# Patient Record
Sex: FEMALE | Race: WHITE | NOT HISPANIC OR LATINO
[De-identification: names, ages, dates, MRNs, and addresses within clinical notes are randomized per-mention and may not be internally consistent; named-entity substitution may affect disease eponyms.]

---

## 2017-06-07 ENCOUNTER — RESULT REVIEW (OUTPATIENT)
Age: 52
End: 2017-06-07

## 2017-08-31 ENCOUNTER — APPOINTMENT (OUTPATIENT)
Dept: ULTRASOUND IMAGING | Facility: IMAGING CENTER | Age: 52
End: 2017-08-31
Payer: COMMERCIAL

## 2017-08-31 ENCOUNTER — APPOINTMENT (OUTPATIENT)
Dept: MAMMOGRAPHY | Facility: IMAGING CENTER | Age: 52
End: 2017-08-31
Payer: COMMERCIAL

## 2017-08-31 ENCOUNTER — OUTPATIENT (OUTPATIENT)
Dept: OUTPATIENT SERVICES | Facility: HOSPITAL | Age: 52
LOS: 1 days | End: 2017-08-31
Payer: COMMERCIAL

## 2017-08-31 DIAGNOSIS — Z00.8 ENCOUNTER FOR OTHER GENERAL EXAMINATION: ICD-10-CM

## 2017-08-31 PROCEDURE — 76641 ULTRASOUND BREAST COMPLETE: CPT

## 2017-08-31 PROCEDURE — 77067 SCR MAMMO BI INCL CAD: CPT

## 2017-08-31 PROCEDURE — G0202: CPT | Mod: 26

## 2017-08-31 PROCEDURE — 76641 ULTRASOUND BREAST COMPLETE: CPT | Mod: 26,50

## 2017-08-31 PROCEDURE — 77063 BREAST TOMOSYNTHESIS BI: CPT | Mod: 26

## 2017-08-31 PROCEDURE — 77063 BREAST TOMOSYNTHESIS BI: CPT

## 2018-12-26 ENCOUNTER — TRANSCRIPTION ENCOUNTER (OUTPATIENT)
Age: 53
End: 2018-12-26

## 2019-01-04 ENCOUNTER — TRANSCRIPTION ENCOUNTER (OUTPATIENT)
Age: 54
End: 2019-01-04

## 2019-03-25 ENCOUNTER — RESULT REVIEW (OUTPATIENT)
Age: 54
End: 2019-03-25

## 2019-04-16 ENCOUNTER — APPOINTMENT (OUTPATIENT)
Dept: ULTRASOUND IMAGING | Facility: IMAGING CENTER | Age: 54
End: 2019-04-16
Payer: COMMERCIAL

## 2019-04-16 ENCOUNTER — APPOINTMENT (OUTPATIENT)
Dept: MAMMOGRAPHY | Facility: IMAGING CENTER | Age: 54
End: 2019-04-16
Payer: COMMERCIAL

## 2019-04-16 ENCOUNTER — OUTPATIENT (OUTPATIENT)
Dept: OUTPATIENT SERVICES | Facility: HOSPITAL | Age: 54
LOS: 1 days | End: 2019-04-16
Payer: COMMERCIAL

## 2019-04-16 DIAGNOSIS — Z12.31 ENCOUNTER FOR SCREENING MAMMOGRAM FOR MALIGNANT NEOPLASM OF BREAST: ICD-10-CM

## 2019-04-16 DIAGNOSIS — R92.2 INCONCLUSIVE MAMMOGRAM: ICD-10-CM

## 2019-04-16 PROCEDURE — 77067 SCR MAMMO BI INCL CAD: CPT | Mod: 26

## 2019-04-16 PROCEDURE — 77063 BREAST TOMOSYNTHESIS BI: CPT | Mod: 26

## 2019-04-16 PROCEDURE — 76641 ULTRASOUND BREAST COMPLETE: CPT | Mod: 26,50

## 2019-04-16 PROCEDURE — 77067 SCR MAMMO BI INCL CAD: CPT

## 2019-04-16 PROCEDURE — 77063 BREAST TOMOSYNTHESIS BI: CPT

## 2019-04-16 PROCEDURE — 76641 ULTRASOUND BREAST COMPLETE: CPT

## 2019-09-09 ENCOUNTER — TRANSCRIPTION ENCOUNTER (OUTPATIENT)
Age: 54
End: 2019-09-09

## 2019-09-23 ENCOUNTER — NON-APPOINTMENT (OUTPATIENT)
Age: 54
End: 2019-09-23

## 2019-09-23 ENCOUNTER — APPOINTMENT (OUTPATIENT)
Dept: FAMILY MEDICINE | Facility: CLINIC | Age: 54
End: 2019-09-23
Payer: COMMERCIAL

## 2019-09-23 ENCOUNTER — LABORATORY RESULT (OUTPATIENT)
Age: 54
End: 2019-09-23

## 2019-09-23 VITALS
DIASTOLIC BLOOD PRESSURE: 80 MMHG | SYSTOLIC BLOOD PRESSURE: 126 MMHG | OXYGEN SATURATION: 98 % | BODY MASS INDEX: 29.35 KG/M2 | HEIGHT: 70 IN | WEIGHT: 205 LBS | HEART RATE: 80 BPM

## 2019-09-23 DIAGNOSIS — Z82.5 FAMILY HISTORY OF ASTHMA AND OTHER CHRONIC LOWER RESPIRATORY DISEASES: ICD-10-CM

## 2019-09-23 DIAGNOSIS — Z78.9 OTHER SPECIFIED HEALTH STATUS: ICD-10-CM

## 2019-09-23 DIAGNOSIS — Z81.1 FAMILY HISTORY OF ALCOHOL ABUSE AND DEPENDENCE: ICD-10-CM

## 2019-09-23 DIAGNOSIS — Z83.3 FAMILY HISTORY OF DIABETES MELLITUS: ICD-10-CM

## 2019-09-23 DIAGNOSIS — N39.0 URINARY TRACT INFECTION, SITE NOT SPECIFIED: ICD-10-CM

## 2019-09-23 DIAGNOSIS — R06.83 SNORING: ICD-10-CM

## 2019-09-23 DIAGNOSIS — Z87.891 PERSONAL HISTORY OF NICOTINE DEPENDENCE: ICD-10-CM

## 2019-09-23 LAB
BILIRUB UR QL STRIP: NEGATIVE
CLARITY UR: CLEAR
COLLECTION METHOD: NORMAL
GLUCOSE UR-MCNC: NEGATIVE
HCG UR QL: 0.2 EU/DL
HGB UR QL STRIP.AUTO: NEGATIVE
KETONES UR-MCNC: NEGATIVE
LEUKOCYTE ESTERASE UR QL STRIP: NORMAL
NITRITE UR QL STRIP: NEGATIVE
PH UR STRIP: 6
PROT UR STRIP-MCNC: NEGATIVE
SP GR UR STRIP: 1.02

## 2019-09-23 PROCEDURE — G0444 DEPRESSION SCREEN ANNUAL: CPT

## 2019-09-23 PROCEDURE — 81003 URINALYSIS AUTO W/O SCOPE: CPT | Mod: QW

## 2019-09-23 PROCEDURE — 93000 ELECTROCARDIOGRAM COMPLETE: CPT

## 2019-09-23 PROCEDURE — 99386 PREV VISIT NEW AGE 40-64: CPT | Mod: 25

## 2019-09-23 PROCEDURE — 36415 COLL VENOUS BLD VENIPUNCTURE: CPT

## 2019-09-23 NOTE — PLAN
[FreeTextEntry1] : Snoring- f/u sleep study\par Shoulder/hip pain- may need ortho f/u. f/u ESR\par Urethral burning- trace LE, will treat as UTI, f/u cx\par HCM- f/u labs, EKG NSR 74 bpm, referral for colonoscopy

## 2019-09-23 NOTE — HEALTH RISK ASSESSMENT
[Patient reported mammogram was normal] : Patient reported mammogram was normal [Patient reported PAP Smear was normal] : Patient reported PAP Smear was normal [MammogramDate] : 5/2019 [PapSmearDate] : 5/2019

## 2019-09-23 NOTE — PHYSICAL EXAM
[No Acute Distress] : no acute distress [Well-Appearing] : well-appearing [Normal Sclera/Conjunctiva] : normal sclera/conjunctiva [PERRL] : pupils equal round and reactive to light [Normal Oropharynx] : the oropharynx was normal [Normal Outer Ear/Nose] : the outer ears and nose were normal in appearance [No Lymphadenopathy] : no lymphadenopathy [Supple] : supple [No Respiratory Distress] : no respiratory distress  [No Accessory Muscle Use] : no accessory muscle use [Regular Rhythm] : with a regular rhythm [Normal Rate] : normal rate  [Soft] : abdomen soft [Non Tender] : non-tender [Non-distended] : non-distended [Normal Bowel Sounds] : normal bowel sounds [No Focal Deficits] : no focal deficits [Normal Affect] : the affect was normal [Normal Insight/Judgement] : insight and judgment were intact

## 2019-09-23 NOTE — REVIEW OF SYSTEMS
[Fever] : no fever [Chills] : no chills [Fatigue] : no fatigue [Discharge] : no discharge [Vision Problems] : no vision problems [Earache] : no earache [Hearing Loss] : no hearing loss [Nasal Discharge] : no nasal discharge [Palpitations] : no palpitations [Chest Pain] : no chest pain [Shortness Of Breath] : no shortness of breath [Cough] : no cough [Wheezing] : no wheezing [Nausea] : no nausea [Abdominal Pain] : no abdominal pain [Diarrhea] : diarrhea [Vomiting] : no vomiting [Dysuria] : no dysuria [Hematuria] : no hematuria [Frequency] : no frequency [Joint Pain] : joint pain [Joint Stiffness] : joint stiffness [Headache] : no headache [Dizziness] : no dizziness [Anxiety] : no anxiety [Depression] : no depression

## 2019-09-23 NOTE — HISTORY OF PRESENT ILLNESS
[FreeTextEntry1] : establish care [de-identified] : 53 year old female presents to establish care. She complains of chronic shoulder and hip pain. She also complains today of urethral burning that does not occur with urination.Denies hesitancy and urgency. Denies using new bath products or detergents. She otherwise feels well. Typically goes to the gym and eats well although due to recent back pain has not worked out. She had recent mammo which was normal. Due for colonoscopy . She also admits to excessive snoring and daytime fatigue .Was told by dentist to be evaluated for DARRION

## 2019-09-26 ENCOUNTER — TRANSCRIPTION ENCOUNTER (OUTPATIENT)
Age: 54
End: 2019-09-26

## 2019-09-26 LAB
ALBUMIN SERPL ELPH-MCNC: 4.2 G/DL
ALP BLD-CCNC: 59 U/L
ALT SERPL-CCNC: 14 U/L
ANION GAP SERPL CALC-SCNC: 12 MMOL/L
AST SERPL-CCNC: 15 U/L
B MICROTI AB SER QL: NORMAL
BABESIA ANTIBODIES, IGG: NORMAL
BABESIA ANTIBODIES, IGM: NORMAL
BASOPHILS # BLD AUTO: 0.05 K/UL
BASOPHILS NFR BLD AUTO: 0.6 %
BILIRUB SERPL-MCNC: 0.3 MG/DL
BUN SERPL-MCNC: 17 MG/DL
CALCIUM SERPL-MCNC: 9.3 MG/DL
CHLORIDE SERPL-SCNC: 101 MMOL/L
CHOLEST SERPL-MCNC: 175 MG/DL
CHOLEST/HDLC SERPL: 3.6 RATIO
CO2 SERPL-SCNC: 24 MMOL/L
CREAT SERPL-MCNC: 0.77 MG/DL
EOSINOPHIL # BLD AUTO: 0.39 K/UL
EOSINOPHIL NFR BLD AUTO: 4.3 %
ERYTHROCYTE [SEDIMENTATION RATE] IN BLOOD BY WESTERGREN METHOD: 29 MM/HR
ESTIMATED AVERAGE GLUCOSE: 126 MG/DL
GLUCOSE SERPL-MCNC: 92 MG/DL
HBA1C MFR BLD HPLC: 6 %
HCT VFR BLD CALC: 36.5 %
HDLC SERPL-MCNC: 49 MG/DL
HGB BLD-MCNC: 11.4 G/DL
IMM GRANULOCYTES NFR BLD AUTO: 0.2 %
LDLC SERPL CALC-MCNC: 93 MG/DL
LYMPHOCYTES # BLD AUTO: 2.87 K/UL
LYMPHOCYTES NFR BLD AUTO: 32 %
MAN DIFF?: NORMAL
MCHC RBC-ENTMCNC: 28.8 PG
MCHC RBC-ENTMCNC: 31.2 GM/DL
MCV RBC AUTO: 92.2 FL
MONOCYTES # BLD AUTO: 0.69 K/UL
MONOCYTES NFR BLD AUTO: 7.7 %
NEUTROPHILS # BLD AUTO: 4.95 K/UL
NEUTROPHILS NFR BLD AUTO: 55.2 %
PLATELET # BLD AUTO: 290 K/UL
POTASSIUM SERPL-SCNC: 4.2 MMOL/L
PROT SERPL-MCNC: 6.9 G/DL
RBC # BLD: 3.96 M/UL
RBC # FLD: 13.8 %
SODIUM SERPL-SCNC: 137 MMOL/L
TRIGL SERPL-MCNC: 166 MG/DL
TSH SERPL-ACNC: 0.91 UIU/ML
WBC # FLD AUTO: 8.97 K/UL

## 2019-09-27 ENCOUNTER — RESULT REVIEW (OUTPATIENT)
Age: 54
End: 2019-09-27

## 2019-09-27 DIAGNOSIS — A77.40 EHRLICHIOSIS, UNSPECIFIED: ICD-10-CM

## 2019-09-27 LAB
25(OH)D3 SERPL-MCNC: 38.2 NG/ML
ANAPLASMA PHAGOCYTO IGM COMENT: NORMAL
ANAPLASMA PHAGOCYTO IGM COMMENT: NORMAL
ANAPLASMA PHAGOCYTOPHILIA IGG ANTIBODIES: ABNORMAL
ANAPLASMA PHAGOCYTOPHILIA IGM ANTIBODIES: NORMAL
EHRLICIA CHAFFEENIS IGG ANTIBODIES: ABNORMAL
EHRLICIA CHAFFEENIS IGG COMMENT: NORMAL
EHRLICIA CHAFFEENIS IGG INTERP: NORMAL
EHRLICIA CHAFFEENIS IGM ANTIBODIES: NORMAL

## 2019-09-30 LAB
B BURGDOR AB SER-IMP: NEGATIVE
B BURGDOR IGG+IGM SER QL: 0.23 INDEX

## 2019-10-21 ENCOUNTER — APPOINTMENT (OUTPATIENT)
Dept: ORTHOPEDIC SURGERY | Facility: CLINIC | Age: 54
End: 2019-10-21
Payer: COMMERCIAL

## 2019-10-21 VITALS
HEART RATE: 79 BPM | WEIGHT: 200 LBS | HEIGHT: 70 IN | BODY MASS INDEX: 28.63 KG/M2 | SYSTOLIC BLOOD PRESSURE: 129 MMHG | DIASTOLIC BLOOD PRESSURE: 77 MMHG | TEMPERATURE: 97.9 F

## 2019-10-21 PROCEDURE — 73502 X-RAY EXAM HIP UNI 2-3 VIEWS: CPT | Mod: RT

## 2019-10-21 PROCEDURE — 99203 OFFICE O/P NEW LOW 30 MIN: CPT

## 2019-10-21 PROCEDURE — 73030 X-RAY EXAM OF SHOULDER: CPT | Mod: RT

## 2019-10-21 NOTE — ASSESSMENT
[FreeTextEntry1] : Patient with right shoulder calcific tendinitis and right hip osteoarthritis. The nature of these conditions were described at length with the patient and she verbalized understanding. We recommend an ultrasound guided aspiration with cortisone injection of the right shoulder and meloxicam for an anti-inflammatory. The patient will follow back up in 6 weeks should her symptoms continue.

## 2019-10-21 NOTE — HISTORY OF PRESENT ILLNESS
[FreeTextEntry1] : 10/21/2019: Patient is a 54 year-old female who presents with about 5 years of right shoulder and right hip pain. The shoulder pain is sharp in nature, located laterally and radiates down the lateral aspect of the humerus. She was treated in the past for calcific tendinitis and her symptoms did not improve. Her right hip pain is mostly in the morning and associated with stiffness upon getting up in the morning. She states she gets pain that radiates into her groin and sometimes has difficulty flexing her hip. She denies any upper and lower extremity paresthesias.

## 2019-10-21 NOTE — PHYSICAL EXAM
[Normal] : Oriented to person, place, and time, insight and judgement were intact and the affect was normal [de-identified] : Right Shoulder Exam\par \par Inspection: No malalignment, no defects\par Skin: No masses, no lesions, skin intact\par Neck: Negative Spurlings, full ROM without pain\par ROM: Full active ROM of the RIGHT shoulder with pain on overhead motions\par Tenderness: No bicipital tenderness, no tenderness to the greater tuberosity/RTC insertion, no anterior shoulder/lesser tuberosity tenderness\par Strength: 5/5 strength against resistance with ER, IR, and Supraspinatus testing.\par AC Joint: No TTP, no pain with cross arm testing.\par Biceps: Speed negative, Yergusons negative\par Impingement Test: Positive Hassan\par Stability: Negative Apprehension, negative anterior/posterior load and shift\par Vascular: 2+ radial pulse\par Neuro: AIN/PIN/Ulnar nn. intact to motor\par Sensation: Grossly intact without deficits\par \par Hip Examination \par ROM \par Flexion 125 degrees\par Extension 115 degrees\par External 45 degrees\par Internal 45 Degrees\par Abduction 45 degrees\par Adduction 45 degrees\par \par Length equal bilaterally \par \par No Pain with SLR, Mild pain with hip flexion, No pain to trochanteric Bursa,  \par \par TAMY - Negative \par  Impingement negative\par \par Distal pulses 2 +\par Sensation intact to touch throughout leg and equal bilaterally\par  [de-identified] : 2 xray views of the right shoulder were obtained. There are no fractures or dislocations noted. There is evidence of calcific tendinitis. \par 3 xray views of the right hip were obtained. There are no fractures or dislocations noted. There is evidence of early osteoarthritis.

## 2019-10-25 ENCOUNTER — APPOINTMENT (OUTPATIENT)
Dept: ULTRASOUND IMAGING | Facility: CLINIC | Age: 54
End: 2019-10-25

## 2019-11-10 ENCOUNTER — FORM ENCOUNTER (OUTPATIENT)
Age: 54
End: 2019-11-10

## 2019-11-11 ENCOUNTER — APPOINTMENT (OUTPATIENT)
Dept: ULTRASOUND IMAGING | Facility: CLINIC | Age: 54
End: 2019-11-11
Payer: COMMERCIAL

## 2019-11-11 ENCOUNTER — OUTPATIENT (OUTPATIENT)
Dept: OUTPATIENT SERVICES | Facility: HOSPITAL | Age: 54
LOS: 1 days | End: 2019-11-11
Payer: COMMERCIAL

## 2019-11-11 DIAGNOSIS — Z00.8 ENCOUNTER FOR OTHER GENERAL EXAMINATION: ICD-10-CM

## 2019-11-11 DIAGNOSIS — M75.31 CALCIFIC TENDINITIS OF RIGHT SHOULDER: ICD-10-CM

## 2019-11-11 PROCEDURE — 20611 DRAIN/INJ JOINT/BURSA W/US: CPT | Mod: RT

## 2019-11-11 PROCEDURE — 20611 DRAIN/INJ JOINT/BURSA W/US: CPT

## 2020-01-30 ENCOUNTER — APPOINTMENT (OUTPATIENT)
Dept: ORTHOPEDIC SURGERY | Facility: CLINIC | Age: 55
End: 2020-01-30
Payer: COMMERCIAL

## 2020-01-30 VITALS
HEART RATE: 76 BPM | BODY MASS INDEX: 28.63 KG/M2 | DIASTOLIC BLOOD PRESSURE: 90 MMHG | HEIGHT: 70 IN | WEIGHT: 200 LBS | SYSTOLIC BLOOD PRESSURE: 138 MMHG | TEMPERATURE: 98.4 F

## 2020-01-30 PROCEDURE — 20610 DRAIN/INJ JOINT/BURSA W/O US: CPT | Mod: RT

## 2020-01-30 PROCEDURE — 73502 X-RAY EXAM HIP UNI 2-3 VIEWS: CPT | Mod: RT

## 2020-01-30 PROCEDURE — 73030 X-RAY EXAM OF SHOULDER: CPT | Mod: RT

## 2020-01-30 PROCEDURE — 99214 OFFICE O/P EST MOD 30 MIN: CPT | Mod: 25

## 2020-02-06 NOTE — ADDENDUM
[FreeTextEntry1] : Dictated by Angella Torres, OTR/L, PA \par \par This note was written by Yessenia Larsen on 02/05/2020 acting as scribe for Chetan Rg III, MD

## 2020-02-06 NOTE — PROCEDURE
[de-identified] : Consent: \par At this time, I have recommended an injection to the right shoulder.  The risks and benefits of the procedure were discussed with the patient in detail.  Upon verbal consent of the patient, we proceeded with the injection as noted below.  \par \par Procedure:  \par After a sterile prep, the patient underwent an injection of 9 cc of 1% Lidocaine without epinephrine and 1 cc of Kenalog into the right shoulder.  The patient tolerated the procedure well.  There were no complications.

## 2020-02-06 NOTE — PHYSICAL EXAM
[de-identified] : Right Hip:\par Range of Motion in Degrees:\par 	                                  Claimant:	Normal:	\par Flexion (Active) 	                  120 	                120-degrees	\par Flexion (Passive)	                  120	                120-degrees	\par Extension (Active)	                  -30	                -30-degrees	\par Extension (Passive)	  -30	                -30-degrees	\par Abduction (Active)	                  45-50	                33-10-swyjyid	\par Abduction (Passive)	  45-50	                45-26-fcqobvn	\par Adduction (Active)	                  20-30	                04-60-kscodxr	\par Adduction (Passive)	  20-30	                06-01-aywdgwu	\par Internal Rotation (Active) 	 35	                35-degrees	\par Internal Rotation (Passive)	 35	                35-degrees	\par External Rotation (Active)	 45	                45-degrees	\par External Rotation (Passive)	 45	                45-degrees	\par \par Tenderness into the groin with internal and external rotation and axial load.  No tenderness to palpation over the greater trochanter.  Negative Trendelenburg.  No tenderness with resisted abduction.  No weakness to flexion, extension, abduction or adduction.  No evidence of instability.  No motor or sensory deficits.  2+ DP and PT pulses.  Skin is intact.  No scars, rashes or lesions.  \par  \par Right Shoulder: \par Range of Motion in Degrees:  \par 	                        Claimant:	 Normal:	\par Abduction (Active)	               180	180 degrees	\par Abduction (Passive)	180	180 degrees	\par Forward elevation (Active):	180	180 degrees	\par Forward elevation (Passive):	180	180 degrees	\par External rotation (Active):	45	45 degrees	\par External rotation (Passive):	45	45 degrees	\par Internal rotation (Active):	L-1	L-1	\par Internal rotation (Passive):	L-1	L-1	\par \par No motor weakness to internal rotation, external rotation or abduction in the scapular plane.  Negative crank test.  Negative O’Manpreet’s test.  Negative Speed’s test. Negative Yergason’s test.  Negative cross arm test.  No tenderness to palpation at the AC joint. Positive Hawkin’s sign.  Positive Neer’s sign. Negative apprehension. Negative sulcus sign.  No gross neurological or vascular deficits distally.  Skin is intact.  No rashes, scars or lesions. 2+ radial and ulnar pulses. No extra-articular swelling or tenderness.\par   [de-identified] : Ambulating with a slightly antalgic to antalgic gait.  Station:  Normal.  [de-identified] : General Appearance:  Well-developed, well-nourished female in no acute distress. \par  [de-identified] : X-ray examination, two views of the right shoulder, show calcific tendinitis.  \par \par X-ray examination, two views of the right hip, shows osteoarthritis.

## 2020-02-06 NOTE — DISCUSSION/SUMMARY
[de-identified] : Patient was given a cortisone injection for the right shoulder calcific tendinitis, as noted above.  She was given a prescription for an intra-articular injection for the right hip osteoarthritis.  She is advised to return back to the office in three to four weeks.

## 2020-02-06 NOTE — HISTORY OF PRESENT ILLNESS
[de-identified] : The patient comes in today for her right hip and right shoulder with persistent complaints.

## 2020-02-10 ENCOUNTER — FORM ENCOUNTER (OUTPATIENT)
Age: 55
End: 2020-02-10

## 2020-02-11 ENCOUNTER — OUTPATIENT (OUTPATIENT)
Dept: OUTPATIENT SERVICES | Facility: HOSPITAL | Age: 55
LOS: 1 days | End: 2020-02-11
Payer: COMMERCIAL

## 2020-02-11 ENCOUNTER — APPOINTMENT (OUTPATIENT)
Dept: ULTRASOUND IMAGING | Facility: CLINIC | Age: 55
End: 2020-02-11
Payer: COMMERCIAL

## 2020-02-11 DIAGNOSIS — M16.11 UNILATERAL PRIMARY OSTEOARTHRITIS, RIGHT HIP: ICD-10-CM

## 2020-02-11 PROCEDURE — 20611 DRAIN/INJ JOINT/BURSA W/US: CPT

## 2020-02-11 PROCEDURE — 20611 DRAIN/INJ JOINT/BURSA W/US: CPT | Mod: RT

## 2020-04-25 ENCOUNTER — MESSAGE (OUTPATIENT)
Age: 55
End: 2020-04-25

## 2020-05-08 ENCOUNTER — APPOINTMENT (OUTPATIENT)
Dept: DISASTER EMERGENCY | Facility: HOSPITAL | Age: 55
End: 2020-05-08

## 2020-05-09 LAB
SARS-COV-2 IGG SERPL IA-ACNC: 0.1 INDEX
SARS-COV-2 IGG SERPL QL IA: NEGATIVE

## 2020-06-12 ENCOUNTER — RESULT REVIEW (OUTPATIENT)
Age: 55
End: 2020-06-12

## 2020-09-16 ENCOUNTER — OUTPATIENT (OUTPATIENT)
Dept: OUTPATIENT SERVICES | Facility: HOSPITAL | Age: 55
LOS: 1 days | End: 2020-09-16
Payer: COMMERCIAL

## 2020-09-16 ENCOUNTER — APPOINTMENT (OUTPATIENT)
Dept: MAMMOGRAPHY | Facility: CLINIC | Age: 55
End: 2020-09-16
Payer: COMMERCIAL

## 2020-09-16 ENCOUNTER — APPOINTMENT (OUTPATIENT)
Dept: ULTRASOUND IMAGING | Facility: CLINIC | Age: 55
End: 2020-09-16
Payer: COMMERCIAL

## 2020-09-16 DIAGNOSIS — Z12.31 ENCOUNTER FOR SCREENING MAMMOGRAM FOR MALIGNANT NEOPLASM OF BREAST: ICD-10-CM

## 2020-09-16 DIAGNOSIS — Z00.8 ENCOUNTER FOR OTHER GENERAL EXAMINATION: ICD-10-CM

## 2020-09-16 PROCEDURE — 76641 ULTRASOUND BREAST COMPLETE: CPT | Mod: 26,50

## 2020-09-16 PROCEDURE — 77063 BREAST TOMOSYNTHESIS BI: CPT | Mod: 26

## 2020-09-16 PROCEDURE — 77067 SCR MAMMO BI INCL CAD: CPT | Mod: 26

## 2020-09-16 PROCEDURE — 77063 BREAST TOMOSYNTHESIS BI: CPT

## 2020-09-16 PROCEDURE — 77067 SCR MAMMO BI INCL CAD: CPT

## 2020-09-16 PROCEDURE — 76641 ULTRASOUND BREAST COMPLETE: CPT

## 2020-10-06 ENCOUNTER — NON-APPOINTMENT (OUTPATIENT)
Age: 55
End: 2020-10-06

## 2020-10-06 ENCOUNTER — APPOINTMENT (OUTPATIENT)
Dept: FAMILY MEDICINE | Facility: CLINIC | Age: 55
End: 2020-10-06
Payer: COMMERCIAL

## 2020-10-06 VITALS
WEIGHT: 195.5 LBS | SYSTOLIC BLOOD PRESSURE: 136 MMHG | DIASTOLIC BLOOD PRESSURE: 90 MMHG | HEIGHT: 69.75 IN | OXYGEN SATURATION: 98 % | BODY MASS INDEX: 28.31 KG/M2 | TEMPERATURE: 98 F | HEART RATE: 84 BPM

## 2020-10-06 PROCEDURE — 99396 PREV VISIT EST AGE 40-64: CPT | Mod: 25

## 2020-10-06 PROCEDURE — 96127 BRIEF EMOTIONAL/BEHAV ASSMT: CPT | Mod: 59

## 2020-10-06 PROCEDURE — 93000 ELECTROCARDIOGRAM COMPLETE: CPT

## 2020-10-06 RX ORDER — PHENAZOPYRIDINE HYDROCHLORIDE 200 MG/1
200 TABLET ORAL
Qty: 6 | Refills: 0 | Status: COMPLETED | COMMUNITY
Start: 2019-09-23 | End: 2020-10-06

## 2020-10-06 RX ORDER — DOXYCYCLINE HYCLATE 100 MG/1
100 CAPSULE ORAL
Qty: 28 | Refills: 0 | Status: COMPLETED | COMMUNITY
Start: 2019-09-27 | End: 2020-10-06

## 2020-10-06 RX ORDER — MELOXICAM 15 MG/1
15 TABLET ORAL
Qty: 30 | Refills: 2 | Status: COMPLETED | COMMUNITY
Start: 2019-10-21 | End: 2020-10-06

## 2020-10-06 RX ORDER — SULFAMETHOXAZOLE AND TRIMETHOPRIM 800; 160 MG/1; MG/1
800-160 TABLET ORAL TWICE DAILY
Qty: 6 | Refills: 0 | Status: COMPLETED | COMMUNITY
Start: 2019-09-23 | End: 2020-10-06

## 2020-10-06 NOTE — PLAN
[FreeTextEntry1] : Hip pain and shoulder pain\par - cont f/u with ortho\par \par Joint pain and stiffnewss\par - f/u rheum markers to r/o autoimmune etiology\par \par GERD\par - GI referral\par \par HCM\par - f/u labs\par - mammmo 2020\par - GYN 2020\par

## 2020-10-06 NOTE — PHYSICAL EXAM
[No Acute Distress] : no acute distress [Well-Appearing] : well-appearing [Normal Sclera/Conjunctiva] : normal sclera/conjunctiva [PERRL] : pupils equal round and reactive to light [Normal Outer Ear/Nose] : the outer ears and nose were normal in appearance [Normal TMs] : both tympanic membranes were normal [No Respiratory Distress] : no respiratory distress  [No Accessory Muscle Use] : no accessory muscle use [Clear to Auscultation] : lungs were clear to auscultation bilaterally [Normal Rate] : normal rate  [Regular Rhythm] : with a regular rhythm [Soft] : abdomen soft [Non Tender] : non-tender [Non-distended] : non-distended [Normal Bowel Sounds] : normal bowel sounds [No Rash] : no rash [No Focal Deficits] : no focal deficits [Normal Affect] : the affect was normal [Normal Insight/Judgement] : insight and judgment were intact

## 2020-10-06 NOTE — HISTORY OF PRESENT ILLNESS
[FreeTextEntry1] : CPE  [de-identified] : 55 year old female presents for CPE. She complains of R hip and shoulder pain. Has seen ortho and was told she will need a hip replacement but she is hesitant. Also complains of stiff hands. She has a hx of GERD and is due for screening colonoscopy. Is UTD with GYN and mammogram.

## 2020-10-06 NOTE — HEALTH RISK ASSESSMENT
[Patient reported mammogram was normal] : Patient reported mammogram was normal [Patient reported PAP Smear was normal] : Patient reported PAP Smear was normal [MammogramDate] : 09/20 [PapSmearDate] : 03/20 [ColonoscopyDate] : due

## 2020-10-08 LAB
25(OH)D3 SERPL-MCNC: 33.1 NG/ML
ALBUMIN SERPL ELPH-MCNC: 4.2 G/DL
ALP BLD-CCNC: 59 U/L
ALT SERPL-CCNC: 11 U/L
ANA SER IF-ACNC: NEGATIVE
ANION GAP SERPL CALC-SCNC: 12 MMOL/L
AST SERPL-CCNC: 18 U/L
BASOPHILS # BLD AUTO: 0.06 K/UL
BASOPHILS NFR BLD AUTO: 0.9 %
BILIRUB SERPL-MCNC: 0.2 MG/DL
BUN SERPL-MCNC: 16 MG/DL
CALCIUM SERPL-MCNC: 9.2 MG/DL
CCP AB SER IA-ACNC: <8 UNITS
CHLORIDE SERPL-SCNC: 104 MMOL/L
CHOLEST SERPL-MCNC: 187 MG/DL
CHOLEST/HDLC SERPL: 3.1 RATIO
CO2 SERPL-SCNC: 23 MMOL/L
CREAT SERPL-MCNC: 0.66 MG/DL
DSDNA AB SER-ACNC: <12 IU/ML
ENA SCL70 IGG SER IA-ACNC: <0.2 AL
ENA SS-A AB SER IA-ACNC: <0.2 AL
ENA SS-B AB SER IA-ACNC: 0.2 AL
EOSINOPHIL # BLD AUTO: 0.41 K/UL
EOSINOPHIL NFR BLD AUTO: 6 %
ESTIMATED AVERAGE GLUCOSE: 111 MG/DL
GLUCOSE SERPL-MCNC: 94 MG/DL
HBA1C MFR BLD HPLC: 5.5 %
HCT VFR BLD CALC: 39.2 %
HDLC SERPL-MCNC: 61 MG/DL
HGB BLD-MCNC: 12.2 G/DL
IMM GRANULOCYTES NFR BLD AUTO: 0.3 %
LDLC SERPL CALC-MCNC: 108 MG/DL
LYMPHOCYTES # BLD AUTO: 2.48 K/UL
LYMPHOCYTES NFR BLD AUTO: 36.2 %
MAN DIFF?: NORMAL
MCHC RBC-ENTMCNC: 30.3 PG
MCHC RBC-ENTMCNC: 31.1 GM/DL
MCV RBC AUTO: 97.3 FL
MONOCYTES # BLD AUTO: 0.56 K/UL
MONOCYTES NFR BLD AUTO: 8.2 %
NEUTROPHILS # BLD AUTO: 3.32 K/UL
NEUTROPHILS NFR BLD AUTO: 48.4 %
PLATELET # BLD AUTO: 299 K/UL
POTASSIUM SERPL-SCNC: 4.2 MMOL/L
PROT SERPL-MCNC: 6.5 G/DL
RBC # BLD: 4.03 M/UL
RBC # FLD: 13.8 %
RF+CCP IGG SER-IMP: NEGATIVE
RHEUMATOID FACT SER QL: <10 IU/ML
SODIUM SERPL-SCNC: 139 MMOL/L
TRIGL SERPL-MCNC: 93 MG/DL
TSH SERPL-ACNC: 1.18 UIU/ML
WBC # FLD AUTO: 6.85 K/UL

## 2020-10-28 ENCOUNTER — APPOINTMENT (OUTPATIENT)
Dept: ORTHOPEDIC SURGERY | Facility: CLINIC | Age: 55
End: 2020-10-28
Payer: COMMERCIAL

## 2020-10-28 VITALS
WEIGHT: 195 LBS | SYSTOLIC BLOOD PRESSURE: 137 MMHG | BODY MASS INDEX: 28.88 KG/M2 | DIASTOLIC BLOOD PRESSURE: 90 MMHG | TEMPERATURE: 96.8 F | HEIGHT: 69 IN | HEART RATE: 72 BPM

## 2020-10-28 PROCEDURE — 99072 ADDL SUPL MATRL&STAF TM PHE: CPT

## 2020-10-28 PROCEDURE — 99213 OFFICE O/P EST LOW 20 MIN: CPT

## 2020-11-04 NOTE — PHYSICAL EXAM
[de-identified] : Right Hip:\par Range of Motion in Degrees:\par 	                                 Claimant:	            Normal:	\par Flexion (Active) 	                 120 	            120-degrees	\par Flexion (Passive)	                 120	            120-degrees	\par Extension (Active)	                 -30	            -30-degrees	\par Extension (Passive)	 -30	            -30-degrees	\par Abduction (Active)	                 45-50	            37-95-jvuyegc	\par Abduction (Passive)	 45-50	            09-22-mrwkded	\par Adduction (Active)	                 20-30	            18-84-ideshpu	\par Adduction (Passive)	 20-30	            55-30-piduijz	\par Internal Rotation (Active) 	 35	           35-degrees	\par Internal Rotation (Passive)	 35	           35-degrees	\par External Rotation (Active)	 45	           45-degrees	\par External Rotation (Passive)	 45	           45-degrees	\par \par Tenderness into the groin with internal and external rotation and axial load. No tenderness to palpation over the greater trochanter. Negative Trendelenburg. No tenderness with resisted abduction. No weakness to flexion, extension, abduction or adduction. No evidence of instability. No motor or sensory deficits. 2+ DP and PT pulses. Skin is intact. No scars, rashes or lesions. \par  [de-identified] : Gait and Station:  Ambulating with a slightly antalgic to antalgic gait.  Normal Station.  [de-identified] : Appearance:  Well developed, well-nourished female in no acute distress.\par

## 2020-11-04 NOTE — DISCUSSION/SUMMARY
[de-identified] : At this time, due to osteoarthritis of the right hip, I recommended to start on a course of physical therapy and to be reassessed in 4-6 weeks.\par

## 2020-11-04 NOTE — ADDENDUM
[FreeTextEntry1] : This note was written by Lloyd Chow on 11/04/2020, acting as a scribe for Chetan Rg III, MD

## 2020-11-04 NOTE — HISTORY OF PRESENT ILLNESS
[de-identified] : The patient comes in today with complaints of pain primarily to her right hip.  She states she has no complaints of pain to her right shoulder.  She comes in with persistent complaints to her right hip.\par

## 2020-12-15 ENCOUNTER — TRANSCRIPTION ENCOUNTER (OUTPATIENT)
Age: 55
End: 2020-12-15

## 2020-12-21 PROBLEM — N39.0 ACUTE UTI: Status: RESOLVED | Noted: 2019-09-23 | Resolved: 2020-12-21

## 2021-03-18 ENCOUNTER — RESULT REVIEW (OUTPATIENT)
Age: 56
End: 2021-03-18

## 2021-04-30 ENCOUNTER — EMERGENCY (EMERGENCY)
Facility: HOSPITAL | Age: 56
LOS: 1 days | Discharge: HOME | End: 2021-04-30
Attending: EMERGENCY MEDICINE
Payer: COMMERCIAL

## 2021-04-30 VITALS
HEART RATE: 89 BPM | TEMPERATURE: 98 F | SYSTOLIC BLOOD PRESSURE: 163 MMHG | OXYGEN SATURATION: 99 % | RESPIRATION RATE: 16 BRPM | DIASTOLIC BLOOD PRESSURE: 91 MMHG

## 2021-04-30 PROCEDURE — 12002 RPR S/N/AX/GEN/TRNK2.6-7.5CM: CPT

## 2021-04-30 PROCEDURE — 99283 EMERGENCY DEPT VISIT LOW MDM: CPT | Mod: 25

## 2021-04-30 PROCEDURE — 99283 EMERGENCY DEPT VISIT LOW MDM: CPT

## 2021-04-30 RX ADMIN — Medication 1 TABLET(S): at 15:24

## 2021-04-30 NOTE — ED PROVIDER NOTE - PATIENT PORTAL LINK FT
You can access the FollowMyHealth Patient Portal offered by [facility] by registering at the following website: [email or web address]. By joining [facility]’s FollowMyHealth portal, you will also be able to view your health information using other applications (apps) compatible with our system.

## 2021-04-30 NOTE — ED PROVIDER NOTE - CLINICAL SUMMARY MEDICAL DECISION MAKING FREE TEXT BOX
55F otherwise healthy whose own dog (up to date with rabies vaccine) accidently bit her left arm at the vet during a proceedure.  5 cm lac irrigated, cleaned, approximated (but not closed) with suture and patient started on augmentin.  Patiient advised to follow up with PCM in 7-10 days or sooner if signs of infection.  Patient is now feeling improved and workup is unremarkable for any emergent process.   Patient/family was given full return precautions.  Patient was counseled on red flag syndromes such as fever, severe pain, or focal deficits and advised to return to the ED for these reasons or any reason that was concerning to them.  Patient/ family advised to make close follow up with their primary care provider and specialty clinics (as applicable) to follow up with this visit and continue investigation/treatment.  All questions were answered. Patient/family has shown adequate competence and understanding. Patient/family is agreeable to the plan.

## 2021-04-30 NOTE — ED PROVIDER NOTE - PHYSICAL EXAMINATION
General: NAD, well appearing  HEENT: Normocephalic, EOM intact  Neck: No apparent stiffness or JVD  Pulm: Chest wall symmetric and nontender, lungs clear to ascultation   Cardiac: Regular rate and regular rhythm  Abdomen: Nontender and nondistended  Skin: Skin in warm, dry and intact without rashes or lesions other than 5 cm lac to left forearm  Neuro: No apparent motor or sensory deficits  Psych: Alert, oriented, and cooperative

## 2021-04-30 NOTE — ED PROVIDER NOTE - ATTENDING CONTRIBUTION TO CARE
The patient seen and examined    Dog bite    I, Georgi Atkins, performed the initial face to face bedside interview with this patient regarding history of present illness, review of symptoms and relevant past medical, social and family history.  I completed an independent physical examination.  I was the initial provider who evaluated this patient. I have signed out the follow up of any pending tests (i.e. labs, radiological studies) to the resident.  I have communicated the patient’s plan of care and disposition with the resident.

## 2021-05-12 ENCOUNTER — EMERGENCY (EMERGENCY)
Facility: HOSPITAL | Age: 56
LOS: 1 days | Discharge: HOME | End: 2021-05-12
Attending: EMERGENCY MEDICINE
Payer: COMMERCIAL

## 2021-05-12 VITALS
DIASTOLIC BLOOD PRESSURE: 96 MMHG | WEIGHT: 199.96 LBS | RESPIRATION RATE: 18 BRPM | HEIGHT: 70 IN | OXYGEN SATURATION: 98 % | HEART RATE: 80 BPM | SYSTOLIC BLOOD PRESSURE: 145 MMHG | TEMPERATURE: 98 F

## 2021-05-12 PROCEDURE — 99281 EMR DPT VST MAYX REQ PHY/QHP: CPT

## 2021-05-12 PROCEDURE — L9995: CPT

## 2021-05-12 NOTE — ED PROVIDER NOTE - OBJECTIVE STATEMENT
55 y.o female With left forearm dog bite S.p came in Er 11 days ago and she had wound care done come back for suture removal . states she took all antibiotic . denies any fever or chills. pt did not received the tetanus injection at time of injury , unknown last tetanus . wound healed well 55 y.o female With left forearm dog bite S.p came in [facility] 11 days ago and she had wound care done come back for suture removal . states she took all antibiotic . denies any fever or chills. pt did not received the tetanus injection at time of injury , unknown last tetanus . wound healed well.

## 2021-05-12 NOTE — ED PROVIDER NOTE - NSFOLLOWUPINSTRUCTIONS_ED_ALL_ED_FT
please follow up with primary care in regard the last tetanus update    Stitches Removal    WHAT YOU NEED TO KNOW:    Stitches are usually removed within 14 days, depending on the location of the wound. Your healthcare provider will tell you when to return to have your stitches removed. Your provider will use sterile forceps or tweezers to  the knot of each stitch. He or she will cut the stitch with scissors and pull the stitch out. You may feel a slight tug as the stitch comes out.    DISCHARGE INSTRUCTIONS:    Seek care immediately if:   •Your wound splits open or is starting to come apart.      •You suddenly cannot move your injured joint.      •You have sudden numbness around your wound.      •You see red streaks coming from your wound.      Contact your healthcare provider if:   •You have a fever and chills.      •Your wound is red, warm, swollen, or leaking pus.      •There is a bad smell coming from your wound.      •You have increased pain in the wound area.      •You have questions or concerns about your condition or care.      Care for the area after the stitches are removed:   •Do not pull medical tape off. Your provider may place small strips of medical tape across your wound after the stitches have been removed. These strips will peel and fall of on their own. Do not pull them off.      •Clean the area as directed. Carefully wash the area with soap and water. Pat the area dry with a clean towel. Check the area for signs of infection, such as redness, swelling, or pus. Also check that the wound is not coming apart.      •Protect your wound. Your wound can swell, bleed, or split open if it is stretched or bumped. You may need to wear a bandage that supports your wound until it is completely healed.      •Care for a scar. You may have a scar after the stitches are removed. Use sunblock if the area is exposed to the sun. Apply it every day after the stitches are removed. This will help prevent skin discoloration. Talk to your healthcare provider about medicines you can use to make the scar less visible. Some medicines are available without a prescription.      Follow up with your healthcare provider as directed: You may need to return in 3 to 5 days if the stitches are on your face. Stitches on your scalp need to be removed after 7 to 14 days. Stitches over joints may remain in place up to 14 days. Write down your questions so you remember to ask them during your visits.

## 2021-05-12 NOTE — ED PROVIDER NOTE - TEMPLATE, MLM
BP remains stable on amlo 5mg QD; rec low Na diet, increased aerobic exercise; home BP monitoring with goal BP < 130/80     General

## 2021-05-12 NOTE — ED PROVIDER NOTE - PHYSICAL EXAMINATION
Const: AOX3 nontoxic appearing, no apparent respiratory or physical distress. Stable gait   HEENT: NC/AT. Moist mucous membranes.  Eyes: KRYSTAL. EOMI  Neck: Soft and supple. Full ROM without pain.  Resp: Speaking in full sentences. No evidence of respiratory distress.   Skin: left forearm with dog bite 5cm healed well . no erythema edema or drainage or fluctuant not TTP 2 suture in placed    Neuro: Awake, alert & oriented x 3. Moves all extremities symmetrically. Const: AOX3 nontoxic appearing, no apparent respiratory or physical distress. Stable gait   HEENT: NC/AT. Moist mucous membranes.  Eyes: KRYSTAL. EOMI.  Neck: Soft and supple. Full ROM without pain.  Resp: Speaking in full sentences. No evidence of respiratory distress.   Skin: left forearm with dog bite 5cm healed well . no erythema edema or drainage or fluctuant not TTP 2 suture in placed    Neuro: Awake, alert & oriented x 3. Moves all extremities symmetrically.

## 2021-05-12 NOTE — ED PROVIDER NOTE - PATIENT PORTAL LINK FT
You can access the FollowMyHealth Patient Portal offered by [facility] by registering at the following website: [email or web address]. By joining [facility]’s FollowMyHealth portal, you will also be able to view your health information using other applications (apps) compatible with our system.

## 2021-05-12 NOTE — ED PROVIDER NOTE - CLINICAL SUMMARY MEDICAL DECISION MAKING FREE TEXT BOX
2 suture removed , applied the steri strip , ooffered the tetnaus pt denied states she will f.u with pcp

## 2021-05-31 NOTE — ED PROCEDURE NOTE - ATTENDING CONTRIBUTION TO CARE
I, Georgi Atkins, performed the initial face to face bedside interview with this patient regarding history of present illness, review of symptoms and relevant past medical, social and family history.  I completed an independent physical examination.  I was the initial provider who evaluated this patient. I have signed out the follow up of any pending tests (i.e. labs, radiological studies) to the resident.  I have communicated the patient’s plan of care and disposition with the resident.

## 2021-06-18 ENCOUNTER — RESULT REVIEW (OUTPATIENT)
Age: 56
End: 2021-06-18

## 2021-08-08 ENCOUNTER — EMERGENCY (EMERGENCY)
Facility: HOSPITAL | Age: 56
LOS: 1 days | Discharge: HOME | End: 2021-08-08
Attending: EMERGENCY MEDICINE
Payer: COMMERCIAL

## 2021-08-08 VITALS
HEIGHT: 70 IN | OXYGEN SATURATION: 100 % | RESPIRATION RATE: 19 BRPM | SYSTOLIC BLOOD PRESSURE: 132 MMHG | TEMPERATURE: 98 F | DIASTOLIC BLOOD PRESSURE: 81 MMHG | HEART RATE: 74 BPM | WEIGHT: 200.84 LBS

## 2021-08-08 PROCEDURE — 73030 X-RAY EXAM OF SHOULDER: CPT

## 2021-08-08 PROCEDURE — 99284 EMERGENCY DEPT VISIT MOD MDM: CPT

## 2021-08-08 PROCEDURE — 73030 X-RAY EXAM OF SHOULDER: CPT | Mod: 26,RT

## 2021-08-08 PROCEDURE — 96372 THER/PROPH/DIAG INJ SC/IM: CPT

## 2021-08-08 PROCEDURE — 99283 EMERGENCY DEPT VISIT LOW MDM: CPT | Mod: 25

## 2021-08-08 RX ORDER — METHOCARBAMOL 500 MG/1
1500 TABLET, FILM COATED ORAL ONCE
Refills: 0 | Status: COMPLETED | OUTPATIENT
Start: 2021-08-08 | End: 2021-08-08

## 2021-08-08 RX ORDER — METHOCARBAMOL 500 MG/1
2 TABLET, FILM COATED ORAL
Qty: 18 | Refills: 0
Start: 2021-08-08 | End: 2021-08-10

## 2021-08-08 RX ORDER — LIDOCAINE 4 G/100G
1 CREAM TOPICAL ONCE
Refills: 0 | Status: COMPLETED | OUTPATIENT
Start: 2021-08-08 | End: 2021-08-08

## 2021-08-08 RX ORDER — DEXAMETHASONE 0.5 MG/5ML
10 ELIXIR ORAL ONCE
Refills: 0 | Status: DISCONTINUED | OUTPATIENT
Start: 2021-08-08 | End: 2021-08-08

## 2021-08-08 RX ORDER — DEXAMETHASONE 0.5 MG/5ML
10 ELIXIR ORAL ONCE
Refills: 0 | Status: COMPLETED | OUTPATIENT
Start: 2021-08-08 | End: 2021-08-08

## 2021-08-08 RX ADMIN — LIDOCAINE 1 PATCH: 4 CREAM TOPICAL at 14:36

## 2021-08-08 RX ADMIN — Medication 10 MILLIGRAM(S): at 15:24

## 2021-08-08 RX ADMIN — METHOCARBAMOL 1500 MILLIGRAM(S): 500 TABLET, FILM COATED ORAL at 14:36

## 2021-08-08 NOTE — ED STATDOCS - OBJECTIVE STATEMENT
55 F presents to ed c/o R shoulder pain since yesterday morning. States the pain woke her up yesterday morning, throbbing pain worse with movement and palpation. The night before she was bowling. Denies any specific injury or trauma while bowling. Denies any numbness/weakness, or any other relevant complaints.

## 2021-08-08 NOTE — ED STATDOCS - PROGRESS NOTE DETAILS
Feeling better on reassessment. XR with calcific bursitis vs tendonitis. Pain more likely 2/2 to acute strain or overuse with some component of pain from calcifications. Given nsaids and robaxin. Has ortho f/u.

## 2021-08-08 NOTE — ED ADULT TRIAGE NOTE - CHIEF COMPLAINT QUOTE
Pt. complaining of right shoulder pain that began after bowling Friday night.  Pt. denies obvious injury.  Pt. attempted to take aleve and tiger balm without relief

## 2021-08-08 NOTE — ED STATDOCS - ATTENDING CONTRIBUTION TO CARE
I, Georgi Atkins, performed the initial face to face bedside interview with this patient regarding history of present illness, review of symptoms and relevant past medical, social and family history.  I completed an independent physical examination.  I was the initial provider who evaluated this patient. I have signed out the follow up of any pending tests (i.e. labs, radiological studies) to the ACP.  I have communicated the patient’s plan of care and disposition with the ACP. The patient seen and examined    Shoulder pain  Calcific Tendinitis    I, Georgi Atkins, performed the initial face to face bedside interview with this patient regarding history of present illness, review of symptoms and relevant past medical, social and family history.  I completed an independent physical examination.  I was the initial provider who evaluated this patient. I have signed out the follow up of any pending tests (i.e. labs, radiological studies) to the ACP.  I have communicated the patient’s plan of care and disposition with the ACP.

## 2021-08-08 NOTE — ED STATDOCS - PATIENT PORTAL LINK FT
You can access the FollowMyHealth Patient Portal offered by [facility] by registering at the following website: [email or web address]. By joining [facility]’s FollowMyHealth portal, you will also be able to view your health information using other applications (apps) compatible with our system.

## 2021-08-08 NOTE — ED STATDOCS - MUSCULOSKELETAL, MLM
Lateral R shoulder tenderness that tracks to R trapezius. Reduced active ROM. Passively able to fully range shoulder in all directions with mild pain.. RUE is DNVI.

## 2021-09-24 ENCOUNTER — APPOINTMENT (OUTPATIENT)
Dept: ULTRASOUND IMAGING | Facility: CLINIC | Age: 56
End: 2021-09-24
Payer: COMMERCIAL

## 2021-09-24 ENCOUNTER — OUTPATIENT (OUTPATIENT)
Dept: OUTPATIENT SERVICES | Facility: HOSPITAL | Age: 56
LOS: 1 days | End: 2021-09-24
Payer: COMMERCIAL

## 2021-09-24 ENCOUNTER — APPOINTMENT (OUTPATIENT)
Dept: MAMMOGRAPHY | Facility: CLINIC | Age: 56
End: 2021-09-24
Payer: COMMERCIAL

## 2021-09-24 DIAGNOSIS — Z00.8 ENCOUNTER FOR OTHER GENERAL EXAMINATION: ICD-10-CM

## 2021-09-24 PROCEDURE — 77063 BREAST TOMOSYNTHESIS BI: CPT

## 2021-09-24 PROCEDURE — 77067 SCR MAMMO BI INCL CAD: CPT | Mod: 26

## 2021-09-24 PROCEDURE — 76641 ULTRASOUND BREAST COMPLETE: CPT

## 2021-09-24 PROCEDURE — 77063 BREAST TOMOSYNTHESIS BI: CPT | Mod: 26

## 2021-09-24 PROCEDURE — 77067 SCR MAMMO BI INCL CAD: CPT

## 2021-09-24 PROCEDURE — 76641 ULTRASOUND BREAST COMPLETE: CPT | Mod: 26,50

## 2021-10-19 ENCOUNTER — RESULT CHARGE (OUTPATIENT)
Age: 56
End: 2021-10-19

## 2021-10-20 ENCOUNTER — NON-APPOINTMENT (OUTPATIENT)
Age: 56
End: 2021-10-20

## 2021-10-20 ENCOUNTER — APPOINTMENT (OUTPATIENT)
Dept: FAMILY MEDICINE | Facility: CLINIC | Age: 56
End: 2021-10-20
Payer: COMMERCIAL

## 2021-10-20 ENCOUNTER — LABORATORY RESULT (OUTPATIENT)
Age: 56
End: 2021-10-20

## 2021-10-20 VITALS
TEMPERATURE: 96.8 F | HEART RATE: 84 BPM | SYSTOLIC BLOOD PRESSURE: 120 MMHG | DIASTOLIC BLOOD PRESSURE: 80 MMHG | WEIGHT: 193.5 LBS | HEIGHT: 69 IN | OXYGEN SATURATION: 98 % | BODY MASS INDEX: 28.66 KG/M2

## 2021-10-20 PROCEDURE — 93000 ELECTROCARDIOGRAM COMPLETE: CPT | Mod: 59

## 2021-10-20 PROCEDURE — 99396 PREV VISIT EST AGE 40-64: CPT | Mod: 25

## 2021-10-20 PROCEDURE — G0444 DEPRESSION SCREEN ANNUAL: CPT | Mod: NC,59

## 2021-10-20 NOTE — HEALTH RISK ASSESSMENT
[Very Good] : ~his/her~  mood as very good [] : No [0] : 2) Feeling down, depressed, or hopeless: Not at all (0) [PHQ-2 Negative - No further assessment needed] : PHQ-2 Negative - No further assessment needed [de-identified] : no regular workouts [de-identified] : regular diet [[id number]] : 0 [Patient reported colonoscopy was normal] : Patient reported colonoscopy was normal [None] : None [Employed] : employed [Single] : single [Reports changes in hearing] : Reports no changes in hearing [Reports changes in vision] : Reports no changes in vision [Reports changes in dental health] : Reports no changes in dental health [ColonoscopyDate] : 2016 [FreeTextEntry2] :  Bello

## 2021-10-20 NOTE — PHYSICAL EXAM
[Normal Sclera/Conjunctiva] : normal sclera/conjunctiva [Normal] : affect was normal and insight and judgment were intact [de-identified] : No IT band tenderness. Decreased ROM RT hip internal and external rotation. Very limited flexibility only able to do bilateral SLR to 45 degrees. Pain in RT hip lateral with internal rotation.

## 2021-10-20 NOTE — HISTORY OF PRESENT ILLNESS
[de-identified] : Annual CPE\par Right hip pain, stiffness persist. Sits all day in one job, other job walking constantly as . \par Has seen ortho and has had PT.\par Right eye discharge, allergies starting, unknown what she is allergic to in environment. \par GYN up to date

## 2021-10-21 ENCOUNTER — TRANSCRIPTION ENCOUNTER (OUTPATIENT)
Age: 56
End: 2021-10-21

## 2021-10-21 LAB
ALBUMIN SERPL ELPH-MCNC: 4.5 G/DL
ALP BLD-CCNC: 70 U/L
ALT SERPL-CCNC: 18 U/L
ANION GAP SERPL CALC-SCNC: 15 MMOL/L
APPEARANCE: CLEAR
AST SERPL-CCNC: 19 U/L
BACTERIA: NEGATIVE
BASOPHILS # BLD AUTO: 0.07 K/UL
BASOPHILS NFR BLD AUTO: 1.1 %
BILIRUB SERPL-MCNC: 0.7 MG/DL
BILIRUBIN URINE: NEGATIVE
BLOOD URINE: NEGATIVE
BOXELDER IGE QN: <0.1 KUA/L
BUN SERPL-MCNC: 12 MG/DL
CALCIUM SERPL-MCNC: 9.7 MG/DL
CEDAR IGE QN: <0.1 KUA/L
CHLORIDE SERPL-SCNC: 103 MMOL/L
CHOLEST SERPL-MCNC: 227 MG/DL
CO2 SERPL-SCNC: 23 MMOL/L
COCKSFOOT IGE QN: <0.1 KUA/L
COLOR: YELLOW
COMMON RAGWEED IGE QN: <0.1 KUA/L
CREAT SERPL-MCNC: 0.81 MG/DL
DEPRECATED BOXELDER IGE RAST QL: 0
DEPRECATED CEDAR IGE RAST QL: 0
DEPRECATED COCKSFOOT IGE RAST QL: 0
DEPRECATED COMMON RAGWEED IGE RAST QL: 0
DEPRECATED GIANT RAGWEED IGE RAST QL: 0
DEPRECATED KENT BLUE GRASS IGE RAST QL: 0
DEPRECATED RED TOP GRASS IGE RAST QL: 0
DEPRECATED SILVER BIRCH IGE RAST QL: 0
DEPRECATED TIMOTHY IGE RAST QL: 0
DEPRECATED WHITE HICKORY IGE RAST QL: 0
DEPRECATED WHITE OAK IGE RAST QL: 0
EOSINOPHIL # BLD AUTO: 0.37 K/UL
EOSINOPHIL NFR BLD AUTO: 5.7 %
ESTIMATED AVERAGE GLUCOSE: 114 MG/DL
GIANT RAGWEED IGE QN: <0.1 KUA/L
GLUCOSE QUALITATIVE U: NEGATIVE
GLUCOSE SERPL-MCNC: 95 MG/DL
HBA1C MFR BLD HPLC: 5.6 %
HCT VFR BLD CALC: 40.2 %
HDLC SERPL-MCNC: 68 MG/DL
HGB BLD-MCNC: 12.8 G/DL
HYALINE CASTS: 1 /LPF
IMM GRANULOCYTES NFR BLD AUTO: 0.3 %
KENT BLUE GRASS IGE QN: <0.1 KUA/L
KETONES URINE: NEGATIVE
LDLC SERPL CALC-MCNC: 145 MG/DL
LEUKOCYTE ESTERASE URINE: NEGATIVE
LYMPHOCYTES # BLD AUTO: 1.61 K/UL
LYMPHOCYTES NFR BLD AUTO: 24.6 %
MAN DIFF?: NORMAL
MCHC RBC-ENTMCNC: 30.3 PG
MCHC RBC-ENTMCNC: 31.8 GM/DL
MCV RBC AUTO: 95.3 FL
MICROSCOPIC-UA: NORMAL
MONOCYTES # BLD AUTO: 0.51 K/UL
MONOCYTES NFR BLD AUTO: 7.8 %
NEUTROPHILS # BLD AUTO: 3.96 K/UL
NEUTROPHILS NFR BLD AUTO: 60.5 %
NITRITE URINE: NEGATIVE
NONHDLC SERPL-MCNC: 160 MG/DL
PH URINE: 6
PLATELET # BLD AUTO: 268 K/UL
POTASSIUM SERPL-SCNC: 4.6 MMOL/L
PROT SERPL-MCNC: 6.8 G/DL
PROTEIN URINE: NORMAL
RBC # BLD: 4.22 M/UL
RBC # FLD: 14 %
RED BLOOD CELLS URINE: 2 /HPF
RED TOP GRASS IGE QN: <0.1 KUA/L
SILVER BIRCH IGE QN: <0.1 KUA/L
SODIUM SERPL-SCNC: 141 MMOL/L
SPECIFIC GRAVITY URINE: 1.02
SQUAMOUS EPITHELIAL CELLS: 7 /HPF
TIMOTHY IGE QN: <0.1 KUA/L
TRIGL SERPL-MCNC: 74 MG/DL
TSH SERPL-ACNC: 1.04 UIU/ML
UROBILINOGEN URINE: NORMAL
WBC # FLD AUTO: 6.54 K/UL
WHITE BLOOD CELLS URINE: 3 /HPF
WHITE HICKORY IGE QN: <0.1 KUA/L
WHITE OAK IGE QN: <0.1 KUA/L

## 2022-04-08 ENCOUNTER — RESULT REVIEW (OUTPATIENT)
Age: 57
End: 2022-04-08

## 2022-04-25 ENCOUNTER — APPOINTMENT (OUTPATIENT)
Dept: FAMILY MEDICINE | Facility: CLINIC | Age: 57
End: 2022-04-25
Payer: COMMERCIAL

## 2022-04-25 ENCOUNTER — APPOINTMENT (OUTPATIENT)
Dept: ORTHOPEDIC SURGERY | Facility: CLINIC | Age: 57
End: 2022-04-25

## 2022-04-25 VITALS
TEMPERATURE: 98.1 F | DIASTOLIC BLOOD PRESSURE: 80 MMHG | HEIGHT: 69.75 IN | HEART RATE: 76 BPM | BODY MASS INDEX: 28.23 KG/M2 | WEIGHT: 195 LBS | OXYGEN SATURATION: 98 % | SYSTOLIC BLOOD PRESSURE: 134 MMHG

## 2022-04-25 DIAGNOSIS — R00.2 PALPITATIONS: ICD-10-CM

## 2022-04-25 PROCEDURE — 96127 BRIEF EMOTIONAL/BEHAV ASSMT: CPT

## 2022-04-25 PROCEDURE — 99396 PREV VISIT EST AGE 40-64: CPT | Mod: 25

## 2022-04-25 NOTE — PLAN
[FreeTextEntry1] : Heart fluttering\par - cardio referral\par \par HCM\par - has mammo and bone density appt \par - encourage healthy diet and exercise \par - ref for colonoscopy

## 2022-04-25 NOTE — HISTORY OF PRESENT ILLNESS
[FreeTextEntry1] : CPE [de-identified] : 56 year old female presents for CPE\par She complains of episode of chest fluttering and then feeling faint. This has happened before and she saw cardiology and had workup done which was normal. \par Otherwise feels well

## 2022-04-26 ENCOUNTER — APPOINTMENT (OUTPATIENT)
Dept: ORTHOPEDIC SURGERY | Facility: CLINIC | Age: 57
End: 2022-04-26
Payer: COMMERCIAL

## 2022-04-26 VITALS
DIASTOLIC BLOOD PRESSURE: 90 MMHG | HEIGHT: 69.75 IN | HEART RATE: 86 BPM | WEIGHT: 195 LBS | BODY MASS INDEX: 28.23 KG/M2 | SYSTOLIC BLOOD PRESSURE: 147 MMHG

## 2022-04-26 LAB
25(OH)D3 SERPL-MCNC: 33.2 NG/ML
ALBUMIN SERPL ELPH-MCNC: 4.8 G/DL
ALP BLD-CCNC: 79 U/L
ALT SERPL-CCNC: 18 U/L
ANION GAP SERPL CALC-SCNC: 12 MMOL/L
AST SERPL-CCNC: 21 U/L
BASOPHILS # BLD AUTO: 0.07 K/UL
BASOPHILS NFR BLD AUTO: 0.9 %
BILIRUB SERPL-MCNC: 0.8 MG/DL
BUN SERPL-MCNC: 15 MG/DL
CALCIUM SERPL-MCNC: 9.6 MG/DL
CHLORIDE SERPL-SCNC: 100 MMOL/L
CHOLEST SERPL-MCNC: 241 MG/DL
CO2 SERPL-SCNC: 25 MMOL/L
CREAT SERPL-MCNC: 0.71 MG/DL
EGFR: 101 ML/MIN/1.73M2
EOSINOPHIL # BLD AUTO: 0.36 K/UL
EOSINOPHIL NFR BLD AUTO: 4.7 %
ESTIMATED AVERAGE GLUCOSE: 117 MG/DL
GLUCOSE SERPL-MCNC: 96 MG/DL
HBA1C MFR BLD HPLC: 5.7 %
HCT VFR BLD CALC: 42.8 %
HDLC SERPL-MCNC: 72 MG/DL
HGB BLD-MCNC: 13.4 G/DL
IMM GRANULOCYTES NFR BLD AUTO: 0.1 %
LDLC SERPL CALC-MCNC: 155 MG/DL
LYMPHOCYTES # BLD AUTO: 2.43 K/UL
LYMPHOCYTES NFR BLD AUTO: 31.9 %
MAN DIFF?: NORMAL
MCHC RBC-ENTMCNC: 29.5 PG
MCHC RBC-ENTMCNC: 31.3 GM/DL
MCV RBC AUTO: 94.3 FL
MONOCYTES # BLD AUTO: 0.54 K/UL
MONOCYTES NFR BLD AUTO: 7.1 %
NEUTROPHILS # BLD AUTO: 4.21 K/UL
NEUTROPHILS NFR BLD AUTO: 55.3 %
NONHDLC SERPL-MCNC: 169 MG/DL
PLATELET # BLD AUTO: 294 K/UL
POTASSIUM SERPL-SCNC: 4.6 MMOL/L
PROT SERPL-MCNC: 7.5 G/DL
RBC # BLD: 4.54 M/UL
RBC # FLD: 14 %
SODIUM SERPL-SCNC: 138 MMOL/L
TRIGL SERPL-MCNC: 66 MG/DL
TSH SERPL-ACNC: 0.95 UIU/ML
WBC # FLD AUTO: 7.62 K/UL

## 2022-04-26 PROCEDURE — 73502 X-RAY EXAM HIP UNI 2-3 VIEWS: CPT | Mod: RT

## 2022-04-26 PROCEDURE — 99215 OFFICE O/P EST HI 40 MIN: CPT

## 2022-04-26 NOTE — HISTORY OF PRESENT ILLNESS
[de-identified] : Ms. RADHA DURAND is a 56 year old female presenting for evaluation of longstanding right hip pain now progressively worsening.  Right hip pain is localized to the right groin and is worse with weightbearing to including walking distance and rising from a seated position.  She has pain with standing still as well.  Patient also has a history of lower back pain that radiates down the posterior aspect of the right leg.  She has worsening stiffness of the right hip which now interferes with ADLs such as putting on socks and shoes.  She feels that naturally her right foot points outward.  She also feels the right leg is longer than the left. She had an intraarticular steroid injection to the right hip over a year ago. Patient also tried PT without relief. Patient takes OTC NSAIDs and Tylenol without improvement.

## 2022-04-26 NOTE — DISCUSSION/SUMMARY
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 A discussion was had with the patient regarding a right total hip replacement. Anterior and posterior exposures were discussed. For this patient an anterior approach is appropriate. A long discussion was had with the patient as what the total joint replacement would entail. A model was used to demonstrate the operation and to discuss bearing surfaces of the implants. The hospitalization and rehabilitation were discussed. The use of perioperative antibiotics and DVT prophylaxis were discussed. The risks, benefits and alternatives to surgical intervention were discussed at length with the patient. Specific risks discussed included: infection, wound breakdown, numbness and damage to nerves, tendon, muscle, arteries or other blood vessels, and the possibility of leg length discrepancy. The possibility of recurrent pain, no improvement in pain and actual worsening of the pain were also mentioned in conversation with the patient. Medical complications related to the patient's general medical health including deep vein thrombosis, pulmonary embolism, heart attack, stroke, death and other complications from anesthesia were discussed as well. The patient was told that we will take steps to minimize these risks by using sterile technique, antibiotics and DVT prophylaxis when appropriate and following the patient postoperatively in the clinic setting to monitor progress. The benefits of surgery were discussed with the patient including the potential to improve the current clinical condition through operative intervention. Alternatives to surgical intervention include continued conservative management which may yield less than optimal results in this particular patient. All questions were answered to the satisfaction of the patient. Models were used as an educational tool. We did discuss implant choices and fixation, with shared decision making with the patient.		 \par \par We are planning for robotic assistance for the total hip replacement. We discussed that this will require placement of iliac crest pins in the contralateral iliac crest for pelvic bone registration to allow for robotic guidance for the surgery. We will utilize robotic assistance to improve accuracy of the implants, and accurate restoration of both leg lengths and femoral offset.

## 2022-04-26 NOTE — PHYSICAL EXAM
[de-identified] : The patient appears well nourished  and in no apparent distress.  The patient is alert and oriented to person, place, and time.   Affect and mood appear normal. The head is normocephalic and atraumatic.  The eyes reveal normal sclera and extra ocular muscles are intact. The tongue is midline with no apparent lesions.  Skin shows normal turgor with no evidence of eczema or psoriasis.  No respiratory distress noted.  Sensation grossly intact.		  [de-identified] : Exam of the right hip shows hip external rotation of 25 degrees, hip internal rotation of 0 degrees. \par Right leg is 1 inch longer than the left.\par 5/5 motor strength bilaterally distally. Sensation intact distally.		  [de-identified] : X-ray: AP view of the pelvis and 2 views of the right hip demonstrate	bone on bone arthritis.

## 2022-04-26 NOTE — CONSULT LETTER
[Dear  ___] : Dear  [unfilled], [Consult Letter:] : I had the pleasure of evaluating your patient, [unfilled]. [Please see my note below.] : Please see my note below. [Consult Closing:] : Thank you very much for allowing me to participate in the care of this patient.  If you have any questions, please do not hesitate to contact me. [Sincerely,] : Sincerely, [FreeTextEntry2] : Charlotte King MD [FreeTextEntry3] : José Miguel Murdock MD\par Chief of Joint Replacement\par [facility] Knee Replacement \par [facility]\par \par

## 2022-04-26 NOTE — ADDENDUM
[FreeTextEntry1] : This note was authored by Lopez Casiano working as a medical scribe for Dr. José Miguel Murdock. The note was reviewed, edited, and revised by Dr. José Miguel Murdock whom is in agreement with the exam findings, imaging findings, and treatment plan. 04/26/2022

## 2022-05-02 ENCOUNTER — TRANSCRIPTION ENCOUNTER (OUTPATIENT)
Age: 57
End: 2022-05-02

## 2022-06-20 ENCOUNTER — NON-APPOINTMENT (OUTPATIENT)
Age: 57
End: 2022-06-20

## 2022-07-21 ENCOUNTER — APPOINTMENT (OUTPATIENT)
Dept: MAMMOGRAPHY | Facility: CLINIC | Age: 57
End: 2022-07-21

## 2022-07-21 ENCOUNTER — APPOINTMENT (OUTPATIENT)
Dept: ULTRASOUND IMAGING | Facility: CLINIC | Age: 57
End: 2022-07-21

## 2022-07-21 ENCOUNTER — OUTPATIENT (OUTPATIENT)
Dept: OUTPATIENT SERVICES | Facility: HOSPITAL | Age: 57
LOS: 1 days | End: 2022-07-21
Payer: COMMERCIAL

## 2022-07-21 ENCOUNTER — APPOINTMENT (OUTPATIENT)
Dept: RADIOLOGY | Facility: CLINIC | Age: 57
End: 2022-07-21

## 2022-07-21 DIAGNOSIS — Z00.8 ENCOUNTER FOR OTHER GENERAL EXAMINATION: ICD-10-CM

## 2022-07-21 DIAGNOSIS — R92.2 INCONCLUSIVE MAMMOGRAM: ICD-10-CM

## 2022-07-21 DIAGNOSIS — Z13.820 ENCOUNTER FOR SCREENING FOR OSTEOPOROSIS: ICD-10-CM

## 2022-07-21 DIAGNOSIS — Z12.31 ENCOUNTER FOR SCREENING MAMMOGRAM FOR MALIGNANT NEOPLASM OF BREAST: ICD-10-CM

## 2022-07-21 DIAGNOSIS — Z00.00 ENCOUNTER FOR GENERAL ADULT MEDICAL EXAMINATION WITHOUT ABNORMAL FINDINGS: ICD-10-CM

## 2022-07-21 PROCEDURE — 77067 SCR MAMMO BI INCL CAD: CPT | Mod: 26

## 2022-07-21 PROCEDURE — 76641 ULTRASOUND BREAST COMPLETE: CPT | Mod: 26,50

## 2022-07-21 PROCEDURE — 77063 BREAST TOMOSYNTHESIS BI: CPT | Mod: 26

## 2022-07-21 PROCEDURE — 77080 DXA BONE DENSITY AXIAL: CPT | Mod: 26

## 2022-07-21 PROCEDURE — 77063 BREAST TOMOSYNTHESIS BI: CPT

## 2022-07-21 PROCEDURE — 77080 DXA BONE DENSITY AXIAL: CPT

## 2022-07-21 PROCEDURE — 77067 SCR MAMMO BI INCL CAD: CPT

## 2022-07-21 PROCEDURE — 76641 ULTRASOUND BREAST COMPLETE: CPT

## 2022-07-28 ENCOUNTER — NON-APPOINTMENT (OUTPATIENT)
Age: 57
End: 2022-07-28

## 2022-11-29 ENCOUNTER — OUTPATIENT (OUTPATIENT)
Dept: OUTPATIENT SERVICES | Facility: HOSPITAL | Age: 57
LOS: 1 days | End: 2022-11-29
Payer: COMMERCIAL

## 2022-11-29 ENCOUNTER — APPOINTMENT (OUTPATIENT)
Dept: CT IMAGING | Facility: CLINIC | Age: 57
End: 2022-11-29

## 2022-11-29 DIAGNOSIS — Z00.8 ENCOUNTER FOR OTHER GENERAL EXAMINATION: ICD-10-CM

## 2022-11-29 DIAGNOSIS — M16.11 UNILATERAL PRIMARY OSTEOARTHRITIS, RIGHT HIP: ICD-10-CM

## 2022-11-29 PROCEDURE — 73700 CT LOWER EXTREMITY W/O DYE: CPT | Mod: 26,RT

## 2022-11-29 PROCEDURE — 73700 CT LOWER EXTREMITY W/O DYE: CPT

## 2022-12-20 ENCOUNTER — OUTPATIENT (OUTPATIENT)
Dept: OUTPATIENT SERVICES | Facility: HOSPITAL | Age: 57
LOS: 1 days | End: 2022-12-20
Payer: COMMERCIAL

## 2022-12-20 VITALS
TEMPERATURE: 98 F | HEIGHT: 69 IN | DIASTOLIC BLOOD PRESSURE: 88 MMHG | OXYGEN SATURATION: 98 % | WEIGHT: 198.42 LBS | SYSTOLIC BLOOD PRESSURE: 140 MMHG | RESPIRATION RATE: 18 BRPM | HEART RATE: 86 BPM

## 2022-12-20 DIAGNOSIS — Z01.818 ENCOUNTER FOR OTHER PREPROCEDURAL EXAMINATION: ICD-10-CM

## 2022-12-20 DIAGNOSIS — Z98.890 OTHER SPECIFIED POSTPROCEDURAL STATES: Chronic | ICD-10-CM

## 2022-12-20 DIAGNOSIS — M25.551 PAIN IN RIGHT HIP: ICD-10-CM

## 2022-12-20 DIAGNOSIS — U07.1 COVID-19: ICD-10-CM

## 2022-12-20 DIAGNOSIS — Z29.9 ENCOUNTER FOR PROPHYLACTIC MEASURES, UNSPECIFIED: ICD-10-CM

## 2022-12-20 LAB
A1C WITH ESTIMATED AVERAGE GLUCOSE RESULT: 5.6 % — SIGNIFICANT CHANGE UP (ref 4–5.6)
ANION GAP SERPL CALC-SCNC: 12 MMOL/L — SIGNIFICANT CHANGE UP (ref 5–17)
APTT BLD: 26.6 SEC — LOW (ref 27.5–35.5)
BASOPHILS # BLD AUTO: 0.02 K/UL — SIGNIFICANT CHANGE UP (ref 0–0.2)
BASOPHILS NFR BLD AUTO: 0.3 % — SIGNIFICANT CHANGE UP (ref 0–2)
BLD GP AB SCN SERPL QL: SIGNIFICANT CHANGE UP
BUN SERPL-MCNC: 14.3 MG/DL — SIGNIFICANT CHANGE UP (ref 8–20)
CALCIUM SERPL-MCNC: 8.9 MG/DL — SIGNIFICANT CHANGE UP (ref 8.4–10.5)
CHLORIDE SERPL-SCNC: 105 MMOL/L — SIGNIFICANT CHANGE UP (ref 96–108)
CO2 SERPL-SCNC: 27 MMOL/L — SIGNIFICANT CHANGE UP (ref 22–29)
CREAT SERPL-MCNC: 0.73 MG/DL — SIGNIFICANT CHANGE UP (ref 0.5–1.3)
EGFR: 97 ML/MIN/1.73M2 — SIGNIFICANT CHANGE UP
EOSINOPHIL # BLD AUTO: 0.23 K/UL — SIGNIFICANT CHANGE UP (ref 0–0.5)
EOSINOPHIL NFR BLD AUTO: 3.9 % — SIGNIFICANT CHANGE UP (ref 0–6)
ESTIMATED AVERAGE GLUCOSE: 114 MG/DL — SIGNIFICANT CHANGE UP (ref 68–114)
GLUCOSE SERPL-MCNC: 110 MG/DL — HIGH (ref 70–99)
HCT VFR BLD CALC: 40.2 % — SIGNIFICANT CHANGE UP (ref 34.5–45)
HGB BLD-MCNC: 13 G/DL — SIGNIFICANT CHANGE UP (ref 11.5–15.5)
IMM GRANULOCYTES NFR BLD AUTO: 0.2 % — SIGNIFICANT CHANGE UP (ref 0–0.9)
INR BLD: 1.03 RATIO — SIGNIFICANT CHANGE UP (ref 0.88–1.16)
LYMPHOCYTES # BLD AUTO: 1.51 K/UL — SIGNIFICANT CHANGE UP (ref 1–3.3)
LYMPHOCYTES # BLD AUTO: 25.6 % — SIGNIFICANT CHANGE UP (ref 13–44)
MCHC RBC-ENTMCNC: 29.6 PG — SIGNIFICANT CHANGE UP (ref 27–34)
MCHC RBC-ENTMCNC: 32.3 GM/DL — SIGNIFICANT CHANGE UP (ref 32–36)
MCV RBC AUTO: 91.6 FL — SIGNIFICANT CHANGE UP (ref 80–100)
MONOCYTES # BLD AUTO: 0.71 K/UL — SIGNIFICANT CHANGE UP (ref 0–0.9)
MONOCYTES NFR BLD AUTO: 12 % — SIGNIFICANT CHANGE UP (ref 2–14)
NEUTROPHILS # BLD AUTO: 3.42 K/UL — SIGNIFICANT CHANGE UP (ref 1.8–7.4)
NEUTROPHILS NFR BLD AUTO: 58 % — SIGNIFICANT CHANGE UP (ref 43–77)
PLATELET # BLD AUTO: 244 K/UL — SIGNIFICANT CHANGE UP (ref 150–400)
POTASSIUM SERPL-MCNC: 3.9 MMOL/L — SIGNIFICANT CHANGE UP (ref 3.5–5.3)
POTASSIUM SERPL-SCNC: 3.9 MMOL/L — SIGNIFICANT CHANGE UP (ref 3.5–5.3)
PROTHROM AB SERPL-ACNC: 12 SEC — SIGNIFICANT CHANGE UP (ref 10.5–13.4)
RBC # BLD: 4.39 M/UL — SIGNIFICANT CHANGE UP (ref 3.8–5.2)
RBC # FLD: 12.8 % — SIGNIFICANT CHANGE UP (ref 10.3–14.5)
SODIUM SERPL-SCNC: 144 MMOL/L — SIGNIFICANT CHANGE UP (ref 135–145)
WBC # BLD: 5.9 K/UL — SIGNIFICANT CHANGE UP (ref 3.8–10.5)
WBC # FLD AUTO: 5.9 K/UL — SIGNIFICANT CHANGE UP (ref 3.8–10.5)

## 2022-12-20 PROCEDURE — 93005 ELECTROCARDIOGRAM TRACING: CPT

## 2022-12-20 PROCEDURE — 93010 ELECTROCARDIOGRAM REPORT: CPT

## 2022-12-20 PROCEDURE — G0463: CPT

## 2022-12-20 RX ORDER — CHOLECALCIFEROL (VITAMIN D3) 125 MCG
1 CAPSULE ORAL
Qty: 0 | Refills: 0 | DISCHARGE

## 2022-12-20 RX ORDER — MULTIVIT-MIN/FERROUS GLUCONATE 9 MG/15 ML
1 LIQUID (ML) ORAL
Qty: 0 | Refills: 0 | DISCHARGE

## 2022-12-20 NOTE — H&P PST ADULT - PROBLEM SELECTOR PROBLEM 3
[address] [facility] [address]  [place] [address]  Phone: [phone]   Fax:     [phone]    Physical Therapy  Cancellation/No-show Note  Patient Name:  Martin Levine  :  2000   Date:  9/15/2021  Cancelled visits to date: 0  No-shows to date: 1    Patient status for today's appointment patient:  []  Cancelled  []  Rescheduled appointment  [x]  No-show     Reason given by patient:  []  Patient ill  []  Conflicting appointment  []  No transportation    []  Conflict with work  [x]  No reason given  []  Other:     Comments:      Phone call information:   []  Phone call made today to patient at _ time at number provided:      []  Patient answered, conversation as follows:    []  Patient did not answer, message left as follows:  [x]  Phone call not made today    Electronically signed by:  Fredi Powers PT
Need for prophylactic measure

## 2022-12-20 NOTE — H&P PST ADULT - ASSESSMENT
57 yr old female presents with c/o right hip pain with radiation to groin and buttock.  Over past 2 years pt c/o pain getting worse, ambulates without assistance , activity is limited due to pain. Pt c/o pain when sitting to standing and walking. Takes aleve as needed with mild relief. Xray done and bone on bone arthritis noted.  Pt had cortisone injection to hip in  with temporary relief , physical therapy  2020 with no improvement . 7/10 pain today. PT IS SCHEDULED FOR ANTERIOR APPROACH RIGHT HIP REPLACEMENT ON 23 WITH DR ZHU AT [facility]. pre op pcr to be obtained pt had 2 vaccines (pfizer)  medical clearance with DR. Acosta   [phone].   OPIOID RISK TOOL    SD EACH BOX THAT APPLIES AND ADD TOTALS AT THE END    FAMILY HISTORY OF SUBSTANCE ABUSE                 FEMALE         MALE                                                Alcohol                             [  ]1 pt          [  ]3pts                                               Illegal Durgs                     [  ]2 pts        [  ]3pts                                               Rx Drugs                           [  ]4 pts        [  ]4 pts    PERSONAL HISTORY OF SUBSTANCE ABUSE                                                                                          Alcohol                             [  ]3 pts       [  ]3 pts                                               Illegal Durgs                     [  ]4 pts        [  ]4 pts                                               Rx Drugs                           [  ]5 pts        [  ]5 pts    AGE BETWEEN 16-45 YEARS                                      [  ]1 pt         [  ]1 pt    HISTORY OF PREADOLESCENT   SEXUAL ABUSE                                                             [  ]3 pts        [  ]0pts    PSYCHOLOGICAL DISEASE                     ADD, OCD, Bipolar, Schizophrenia        [  ]2 pts         [  ]2 pts                      Depression                                               [  ]1 pt           [  ]1 pt           SCORING TOTAL   (add numbers and type here)              (**0*)                                     A score of 3 or lower indicated LOW risk for future opiod abuse  A score of 4 to 7 indicated moderate risk for future opiod abuse  A score of 8 or higher indicates a high risk for opiod abuse  CAPRINI VTE 2.0 SCORE [CLOT updated 2019]    AGE RELATED RISK FACTORS                                                       MOBILITY RELATED FACTORS  [ X] Age 41-60 years                                            (1 Point)                    [ ] Bed rest                                                        (1 Point)  [ ] Age: 61-74 years                                           (2 Points)                  [ ] Plaster cast                                                   (2 Points)  [ ] Age= 75 years                                              (3 Points)                    [ ] Bed bound for more than 72 hours                 (2 Points)    DISEASE RELATED RISK FACTORS                                               GENDER SPECIFIC FACTORS  [ ] Edema in the lower extremities                       (1 Point)              [ ] Pregnancy                                                     (1 Point)  [ ] Varicose veins                                               (1 Point)                     [ ] Post-partum < 6 weeks                                   (1 Point)             [X ] BMI > 25 Kg/m2                                            (1 Point)                     [ ] Hormonal therapy  or oral contraception          (1 Point)                 [ ] Sepsis (in the previous month)                        (1 Point)               [ ] History of pregnancy complications                 (1 point)  [ ] Pneumonia or serious lung disease                                               [ ] Unexplained or recurrent                     (1 Point)           (in the previous month)                               (1 Point)  [ ] Abnormal pulmonary function test                     (1 Point)                 SURGERY RELATED RISK FACTORS  [ ] Acute myocardial infarction                              (1 Point)               [ ]  Section                                             (1 Point)  [ ] Congestive heart failure (in the previous month)  (1 Point)      [ ] Minor surgery                                                  (1 Point)   [ ] Inflammatory bowel disease                             (1 Point)               [ ] Arthroscopic surgery                                        (2 Points)  [ ] Central venous access                                      (2 Points)                [ ] General surgery lasting more than 45 minutes (2 points)  [ ] Malignancy- Present or previous                   (2 Points)                [ X] Elective arthroplasty                                         (5 points)    [ ] Stroke (in the previous month)                          (5 Points)                                                                                                                                                           HEMATOLOGY RELATED FACTORS                                                 TRAUMA RELATED RISK FACTORS  [ ] Prior episodes of VTE                                     (3 Points)                [ ] Fracture of the hip, pelvis, or leg                       (5 Points)  [ ] Positive family history for VTE                         (3 Points)             [ ] Acute spinal cord injury (in the previous month)  (5 Points)  [ ] Prothrombin 07434 A                                     (3 Points)               [ ] Paralysis  (less than 1 month)                             (5 Points)  [ ] Factor V Leiden                                             (3 Points)                  [ ] Multiple Trauma within 1 month                        (5 Points)  [ ] Lupus anticoagulants                                     (3 Points)                                                           [ ] Anticardiolipin antibodies                               (3 Points)                                                       [ ] High homocysteine in the blood                      (3 Points)                                             [ ] Other congenital or acquired thrombophilia      (3 Points)                                                [ ] Heparin induced thrombocytopenia                  (3 Points)                                     Total Score [   7       ]

## 2022-12-20 NOTE — H&P PST ADULT - LIVES WITH, PROFILE
Alert-The patient is alert, awake and responds to voice. The patient is oriented to time, place, and person. The triage nurse is able to obtain subjective information.
children/significant other

## 2022-12-20 NOTE — H&P PST ADULT - PROBLEM SELECTOR PLAN 1
right hip replacement , direct anterior approach with robot  1/5/23 DR. Murdock  medical clearance DR. Acosta

## 2022-12-20 NOTE — H&P PST ADULT - HISTORY OF PRESENT ILLNESS
57 yr old female presents with c/o right hip pain with radiation to groin and buttock.  Over past 2 years pt c/o pain getting worse, ambulates without assistance , activity is limited due to pain. Pt c/o pain when sitting to standing and walking. Takes aleve as needed with mild relief. Xray done and bone on bone arthritis noted.  Pt had cortisone injection to hip in 2020 with temporary relief , physical therapy  9/2020 with no improvement . 7/10 pain today. PT IS SCHEDULED FOR ANTERIOR APPROACH RIGHT HIP REPLACEMENT ON 1/5/23 WITH DR ZHU AT [facility].

## 2022-12-20 NOTE — H&P PST ADULT - NSHP PST DIAGOTHER LIST_GEN_A_CORE
MSSA+ patient informed and given detailed instructions about the treatment, E- scribed meds to pharmacy. Doctors  office informed. - ALEXIS NP

## 2022-12-21 LAB
MRSA PCR RESULT.: SIGNIFICANT CHANGE UP
S AUREUS DNA NOSE QL NAA+PROBE: DETECTED

## 2022-12-23 ENCOUNTER — APPOINTMENT (OUTPATIENT)
Dept: FAMILY MEDICINE | Facility: CLINIC | Age: 57
End: 2022-12-23

## 2022-12-23 VITALS
OXYGEN SATURATION: 96 % | HEART RATE: 88 BPM | DIASTOLIC BLOOD PRESSURE: 80 MMHG | SYSTOLIC BLOOD PRESSURE: 130 MMHG | RESPIRATION RATE: 16 BRPM

## 2022-12-23 VITALS
SYSTOLIC BLOOD PRESSURE: 146 MMHG | HEART RATE: 71 BPM | HEIGHT: 69.75 IN | TEMPERATURE: 97.2 F | DIASTOLIC BLOOD PRESSURE: 82 MMHG | WEIGHT: 191 LBS | RESPIRATION RATE: 16 BRPM | BODY MASS INDEX: 27.65 KG/M2 | OXYGEN SATURATION: 95 %

## 2022-12-23 DIAGNOSIS — Z01.818 ENCOUNTER FOR OTHER PREPROCEDURAL EXAMINATION: ICD-10-CM

## 2022-12-23 PROCEDURE — 99214 OFFICE O/P EST MOD 30 MIN: CPT

## 2022-12-23 NOTE — HISTORY OF PRESENT ILLNESS
[No Pertinent Cardiac History] : no history of aortic stenosis, atrial fibrillation, coronary artery disease, recent myocardial infarction, or implantable device/pacemaker [No Adverse Anesthesia Reaction] : no adverse anesthesia reaction in self or family member [(Patient denies any chest pain, claudication, dyspnea on exertion, orthopnea, palpitations or syncope)] : Patient denies any chest pain, claudication, dyspnea on exertion, orthopnea, palpitations or syncope [Moderate (4-6 METs)] : Moderate (4-6 METs) [Asthma] : no asthma [COPD] : no COPD [Sleep Apnea] : no sleep apnea [Smoker] : not a smoker [Chronic Anticoagulation] : no chronic anticoagulation [Chronic Kidney Disease] : no chronic kidney disease [Diabetes] : no diabetes [FreeTextEntry1] : Right hip replacement [FreeTextEntry2] : 1/5/2023 [FreeTextEntry3] : Dr. José Miguel Murdock  [FreeTextEntry4] : \par 57 year old female presents for a medical clearance. She has chronic right hip pain secondary from arthritis. She is scheduled for a right hip replacement.\par \par

## 2022-12-23 NOTE — PHYSICAL EXAM
[No Acute Distress] : no acute distress [Well Nourished] : well nourished [Well Developed] : well developed [Well-Appearing] : well-appearing [Normal Sclera/Conjunctiva] : normal sclera/conjunctiva [PERRL] : pupils equal round and reactive to light [Normal Outer Ear/Nose] : the outer ears and nose were normal in appearance [Normal TMs] : both tympanic membranes were normal [Normal Appearance] : was normal in appearance [Neck Supple] : was supple [Normal] : the thyroid was normal [No Respiratory Distress] : no respiratory distress  [Clear to Auscultation] : lungs were clear to auscultation bilaterally [Normal Rate] : normal rate  [Regular Rhythm] : with a regular rhythm [Normal S1, S2] : normal S1 and S2 [No Murmur] : no murmur heard [No Carotid Bruits] : no carotid bruits [No Edema] : there was no peripheral edema [Soft] : abdomen soft [Non Tender] : non-tender [Normal Posterior Cervical Nodes] : no posterior cervical lymphadenopathy [Normal Anterior Cervical Nodes] : no anterior cervical lymphadenopathy [No Rash] : no rash [Alert and Oriented x3] : oriented to person, place, and time [Normal Mood] : the mood was normal

## 2022-12-27 ENCOUNTER — APPOINTMENT (OUTPATIENT)
Dept: ORTHOPEDIC SURGERY | Facility: CLINIC | Age: 57
End: 2022-12-27
Payer: COMMERCIAL

## 2022-12-27 VITALS
WEIGHT: 191 LBS | BODY MASS INDEX: 27.65 KG/M2 | DIASTOLIC BLOOD PRESSURE: 83 MMHG | HEART RATE: 98 BPM | SYSTOLIC BLOOD PRESSURE: 146 MMHG | HEIGHT: 69.75 IN

## 2022-12-27 DIAGNOSIS — M16.11 UNILATERAL PRIMARY OSTEOARTHRITIS, RIGHT HIP: ICD-10-CM

## 2022-12-27 PROCEDURE — 99213 OFFICE O/P EST LOW 20 MIN: CPT

## 2022-12-27 PROCEDURE — 73502 X-RAY EXAM HIP UNI 2-3 VIEWS: CPT

## 2022-12-27 NOTE — HISTORY OF PRESENT ILLNESS
[de-identified] : Ms. RADHA DURAND is a 56 year old female presenting for evaluation of longstanding right hip pain now progressively worsening.  Right hip pain is localized to the right groin and is worse with weightbearing to including walking distance and rising from a seated position.  She has pain with standing still as well.  Patient also has a history of lower back pain that radiates down the posterior aspect of the right leg.  She has worsening stiffness of the right hip which now interferes with ADLs such as putting on socks and shoes.  She feels that naturally her right foot points outward.  She also feels the right leg is longer than the left. She had an intraarticular steroid injection to the right hip over a year ago. Patient also tried PT without relief. Patient takes OTC NSAIDs and Tylenol without improvement. She is currently on schedule for R THR on 1/5/22.

## 2022-12-27 NOTE — PHYSICAL EXAM
MST triggered for reported unsure weight loss. Per chart noted weight gain and good intake is recorded. In ADL MST incorrect. No nutrition intervention indicated at this time. RD available via consult. Will follow per policy.    [de-identified] : The patient appears well nourished  and in no apparent distress.  The patient is alert and oriented to person, place, and time.   Affect and mood appear normal. The head is normocephalic and atraumatic.  The eyes reveal normal sclera and extra ocular muscles are intact. The tongue is midline with no apparent lesions.  Skin shows normal turgor with no evidence of eczema or psoriasis.  No respiratory distress noted.  Sensation grossly intact.		  [de-identified] : Exam of the right hip shows hip external rotation of 25 degrees, hip internal rotation of 0 degrees. \par Right leg is 1 inch longer than the left.\par 5/5 motor strength bilaterally distally. Sensation intact distally.		  [de-identified] : X-ray: AP view of the pelvis and 2 views of the right hip demonstrate	bone on bone arthritis.

## 2022-12-31 RX ORDER — MUPIROCIN 20 MG/G
1 OINTMENT TOPICAL
Qty: 10 | Refills: 0
Start: 2022-12-31 | End: 2023-01-04

## 2023-01-05 ENCOUNTER — APPOINTMENT (OUTPATIENT)
Dept: ORTHOPEDIC SURGERY | Facility: AMBULATORY SURGERY CENTER | Age: 58
End: 2023-01-05
Payer: COMMERCIAL

## 2023-01-05 PROCEDURE — 0055T BONE SRGRY CMPTR CT/MRI IMAG: CPT | Mod: RT

## 2023-01-05 PROCEDURE — 27130 TOTAL HIP ARTHROPLASTY: CPT | Mod: RT

## 2023-01-13 LAB — SARS-COV-2 N GENE NPH QL NAA+PROBE: NOT DETECTED

## 2023-01-24 ENCOUNTER — APPOINTMENT (OUTPATIENT)
Dept: ORTHOPEDIC SURGERY | Facility: CLINIC | Age: 58
End: 2023-01-24
Payer: COMMERCIAL

## 2023-01-24 VITALS
BODY MASS INDEX: 27.65 KG/M2 | SYSTOLIC BLOOD PRESSURE: 134 MMHG | DIASTOLIC BLOOD PRESSURE: 80 MMHG | HEART RATE: 99 BPM | WEIGHT: 191 LBS | HEIGHT: 69.75 IN

## 2023-01-24 PROCEDURE — 73502 X-RAY EXAM HIP UNI 2-3 VIEWS: CPT

## 2023-01-24 PROCEDURE — 99024 POSTOP FOLLOW-UP VISIT: CPT

## 2023-01-24 NOTE — ADDENDUM
[FreeTextEntry1] : This note was authored by Lopez Casiano working as a medical scribe for Dr. José Miguel Murdock. The note was reviewed, edited, and revised by Dr. José Miguel Murdock whom is in agreement with the exam findings, imaging findings, and treatment plan. 01/24/2023

## 2023-01-24 NOTE — HISTORY OF PRESENT ILLNESS
[de-identified] : S/P Right robotic assisted anterior THR with TING, DOS: 1/5/23. [de-identified] : RADHA DURAND is doing well 3 week s/p right total hip replacement. The patient ambulates with a cane outside of the house but not inside. She is reciprocating stairs. She is participating in outpatient physical therapy, as well as a home exercise program daily. Her pain level is controlled. She is taking aspirin 325 mg twice a day for DVT prophylaxis. Overall, she is very happy with the results of the surgery so far.		  [de-identified] : Exam of the right hip and contralateral iliac crest shows well healing incisions, hip flexion of 90 degrees, hip external rotation of 45 degrees, hip internal rotation of 20 degrees. Patient can perform a straight leg raise.		  \par 5/5 motor strength bilaterally distally. Sensation intact distally.		  [de-identified] :  X-ray: AP of the pelvis and 2 views of the right hip demonstrate a right total hip arthroplasty in stable position, with no evidence of fracture, loosening, or dislocation.		  [de-identified] : The patient is doing very well 3 weeks after right anterior total hip replacement. The patient will be continue physical therapy. The patient will take aspirin 325 mg twice per day for DVT prophylaxis for the next three weeks. Anterior hip precautions were reinforced. Overall the patient is very happy with their outcome so far. Followup in 3 weeks with repeat x-rays.

## 2023-01-25 ENCOUNTER — RX RENEWAL (OUTPATIENT)
Age: 58
End: 2023-01-25

## 2023-02-14 ENCOUNTER — APPOINTMENT (OUTPATIENT)
Dept: ORTHOPEDIC SURGERY | Facility: CLINIC | Age: 58
End: 2023-02-14
Payer: COMMERCIAL

## 2023-02-14 VITALS — BODY MASS INDEX: 27.65 KG/M2 | HEIGHT: 69.75 IN | WEIGHT: 191 LBS

## 2023-02-14 DIAGNOSIS — Z96.641 PRESENCE OF RIGHT ARTIFICIAL HIP JOINT: ICD-10-CM

## 2023-02-14 DIAGNOSIS — Z96.649 PRESENCE OF UNSPECIFIED ARTIFICIAL HIP JOINT: ICD-10-CM

## 2023-02-14 DIAGNOSIS — Z47.1 AFTERCARE FOLLOWING JOINT REPLACEMENT SURGERY: ICD-10-CM

## 2023-02-14 PROCEDURE — 73502 X-RAY EXAM HIP UNI 2-3 VIEWS: CPT

## 2023-02-14 PROCEDURE — 99024 POSTOP FOLLOW-UP VISIT: CPT

## 2023-02-14 NOTE — ADDENDUM
[FreeTextEntry1] : This note was authored by Lopez Casiano working as a medical scribe for Dr. José Miguel Murdock. The note was reviewed, edited, and revised by Dr. José Miguel Murdock whom is in agreement with the exam findings, imaging findings, and treatment plan. 02/14/2023

## 2023-02-14 NOTE — HISTORY OF PRESENT ILLNESS
[de-identified] : S/P Right robotic assisted anterior THR with TING, DOS: 1/5/23. \par  [de-identified] : She is doing well s/p right total hip replacement. She completed in home physical therapy and has been going to outpatient PT. She  is taking Tylenol for pain and pain level is controlled. She completed taking aspirin for DVT ppx. Patient denies any fevers chills or constitutional symptoms. Patient denies any falls or Trauma. Overall patient is doing well.  [de-identified] : Exam of the right hip and contralateral iliac crest shows well healed incisions hip flexion of 110 degrees, hip external rotation of 60 degrees, hip internal rotation of 20 degrees. A stitch knot was removed from her incision in office today and a band aid was placed. \par 5/5 motor strength bilaterally distally. Sensation intact distally.		  [de-identified] :  X-ray: AP of the pelvis and 2 views of the right hip demonstrate a right total hip arthroplasty in stable position, with no evidence of fracture, loosening, or dislocation.		  [de-identified] : The patient is doing very well 6 weeks following anterior right total hip replacement. Anterior hip precautions were reviewed and recommended to be followed for another 6 weeks. The patient will continue outpatient physical therapy and gradually transition to a home exercise program over the next 6 weeks. The patient may stop taking full strength aspirin at this point and resume preoperative aspirin dosing if applicable. Dental prophylaxis was reviewed. The patient is doing very well so far and overall very happy with their progress. Follow up one year post op for radiographic surveillance.

## 2023-03-08 PROBLEM — M19.90 UNSPECIFIED OSTEOARTHRITIS, UNSPECIFIED SITE: Chronic | Status: ACTIVE | Noted: 2022-12-20

## 2023-03-09 ENCOUNTER — APPOINTMENT (OUTPATIENT)
Dept: PLASTIC SURGERY | Facility: CLINIC | Age: 58
End: 2023-03-09
Payer: COMMERCIAL

## 2023-03-09 PROCEDURE — 99203 OFFICE O/P NEW LOW 30 MIN: CPT

## 2023-03-11 ENCOUNTER — NON-APPOINTMENT (OUTPATIENT)
Age: 58
End: 2023-03-11

## 2023-03-21 NOTE — HISTORY OF PRESENT ILLNESS
[FreeTextEntry1] : Ms. RADHA DURAND  is a 57 year  old female  with a past medical history of GERD who presents to discuss a lesion on Her  right leg which has been present for many months, she had it biopsied by her dermatologist and it was found to be abnormal cells. She would like to discuss excision for negative margins \par \par non smoker\par no anticoagulation or bleeding disorders\par \par

## 2023-03-21 NOTE — ASSESSMENT
[FreeTextEntry1] : The patient is scheduled for neoplasm extirpation.  I explained that following extirpation there will be a full thickness defect of the involved area.  The reconstructive options will be based on the defect size and surrounding tissue laxity of the involved area.  Primary closure is only possible for smaller defects.  For larger defects, local tissue rearrangement or skin grafting may be necessary.  The patient was explained the risks of each option. Risks following layered primary closure or local tissue rearrangement include wound dehiscence, contour irregularity, bleeding, infection, and parasthesia.  Risks following skin grafting include wound dehiscence, skin graft nonadherence (partial or complete), contour irregularity, bleeding, infection, parasthesia, and donor site complications.  \par \par The patient understands all the risks and has provided informed consent.\par \par \par \par

## 2023-03-21 NOTE — PHYSICAL EXAM
[NI] : Normal [de-identified] : right lateral thigh with well healed biopsy site, hyperpigmented margins, 2 mm x 2 mm

## 2023-03-27 ENCOUNTER — LABORATORY RESULT (OUTPATIENT)
Age: 58
End: 2023-03-27

## 2023-03-28 ENCOUNTER — APPOINTMENT (OUTPATIENT)
Dept: PLASTIC SURGERY | Facility: CLINIC | Age: 58
End: 2023-03-28
Payer: COMMERCIAL

## 2023-03-28 VITALS
HEIGHT: 69 IN | HEART RATE: 98 BPM | RESPIRATION RATE: 14 BRPM | WEIGHT: 206 LBS | SYSTOLIC BLOOD PRESSURE: 126 MMHG | DIASTOLIC BLOOD PRESSURE: 84 MMHG | TEMPERATURE: 97.52 F | BODY MASS INDEX: 30.51 KG/M2 | OXYGEN SATURATION: 97 %

## 2023-03-28 DIAGNOSIS — L98.9 DISORDER OF THE SKIN AND SUBCUTANEOUS TISSUE, UNSPECIFIED: ICD-10-CM

## 2023-03-28 PROCEDURE — 14020 TIS TRNFR S/A/L 10 SQ CM/<: CPT

## 2023-04-19 NOTE — PROCEDURE
[Nl] : None [___ ml Inj] : Anesthesia: [unfilled] ~Uml [1%] : 1% [With Epi] : with epinephrine [Betadine] : using betadine [Cautery] : cautery [FreeTextEntry1] : atypical right thigh lesion [FreeTextEntry2] : excision of right thigh lesion  [FreeTextEntry6] : Mar 28 2023  8:00AM The lesion was identified and marked with a marking pen with a w-plasty excision pattern. The surgical site was prepped and draped in usual sterile technique with betadine. Given the location of the lesion and the need for full-thickness excision, the decision was made to utilize a w-plasty excision and closure technique in order to facilitate the necessary rotation and advancement to close the excision under no tension, and reduce the likelihood of post-excision deformity. A margin of 2 mm was used to design the w-plasty excision. The total area of w-plasty flap rotation and advancement was 2cm x 0.5 cm. 7cc of 1% lidocaine with epinephrine was infiltrated into the planned surgical site. After 7 minutes, the skin was pinched with toothed Adson pickups and the skin was found to be insensate. I used a #15 to perform a full-thickness skin excision along the w-plasty excision markings. Electrocautery was used to dissect down to the subcutaneous tissue. The specimen was completely dissected free and passed off the field and sent to pathology. The superior and inferior w-plasty flaps were then undermined in all directions, and the flaps were rotated and advanced so that the skin edges came together under no tension. I then closed the skin edges with 5-0 monocryl buried simple interrupted sutures at the dermis, 5-0 prolene running pull-out suture at the skin, and then finally dermabond was applied and allowed to dry. The patient tolerated the procedure well and went home afterward. \par \par  [FreeTextEntry7] : right anterior thigh [Excisional: Margin ___mm] : the lesion was excised with a  [unfilled] ~Umm margin in an elliptical fashion [FreeTextEntry8] : right anterior thigh

## 2023-05-26 ENCOUNTER — APPOINTMENT (OUTPATIENT)
Dept: FAMILY MEDICINE | Facility: CLINIC | Age: 58
End: 2023-05-26
Payer: COMMERCIAL

## 2023-05-26 ENCOUNTER — NON-APPOINTMENT (OUTPATIENT)
Age: 58
End: 2023-05-26

## 2023-05-26 VITALS
HEIGHT: 69 IN | HEART RATE: 76 BPM | TEMPERATURE: 208.22 F | OXYGEN SATURATION: 98 % | BODY MASS INDEX: 30.21 KG/M2 | WEIGHT: 204 LBS | DIASTOLIC BLOOD PRESSURE: 82 MMHG | SYSTOLIC BLOOD PRESSURE: 122 MMHG

## 2023-05-26 DIAGNOSIS — Z00.00 ENCOUNTER FOR GENERAL ADULT MEDICAL EXAMINATION W/OUT ABNORMAL FINDINGS: ICD-10-CM

## 2023-05-26 DIAGNOSIS — R68.89 OTHER GENERAL SYMPTOMS AND SIGNS: ICD-10-CM

## 2023-05-26 DIAGNOSIS — Z13.1 ENCOUNTER FOR SCREENING FOR DIABETES MELLITUS: ICD-10-CM

## 2023-05-26 DIAGNOSIS — Z13.31 ENCOUNTER FOR SCREENING FOR DEPRESSION: ICD-10-CM

## 2023-05-26 PROCEDURE — 93000 ELECTROCARDIOGRAM COMPLETE: CPT

## 2023-05-26 PROCEDURE — 99396 PREV VISIT EST AGE 40-64: CPT | Mod: 25

## 2023-05-26 PROCEDURE — 96127 BRIEF EMOTIONAL/BEHAV ASSMT: CPT

## 2023-05-26 NOTE — HISTORY OF PRESENT ILLNESS
[FreeTextEntry1] : CPE [de-identified] : 57 year old female presents for CPE\par Had right hip replacement back in Jan, doing really well. \par Is waiting for colonoscopy due to hip surgery. \par She complains of eye redness and irritation. States she removed thin white film. Has been using pataday drops.

## 2023-05-26 NOTE — HEALTH RISK ASSESSMENT
[0] : 2) Feeling down, depressed, or hopeless: Not at all (0) [PHQ-2 Negative - No further assessment needed] : PHQ-2 Negative - No further assessment needed [Patient reported mammogram was normal] : Patient reported mammogram was normal [[id number]] : 0 [MammogramDate] : 07/22 [PapSmearDate] : 05/23

## 2023-05-30 DIAGNOSIS — S80.262A INSECT BITE (NONVENOMOUS), LEFT KNEE, INITIAL ENCOUNTER: ICD-10-CM
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
## 2023-05-30 LAB
25(OH)D3 SERPL-MCNC: 33.4 NG/ML
ALBUMIN SERPL ELPH-MCNC: 4.6 G/DL
ALP BLD-CCNC: 70 U/L
ALT SERPL-CCNC: 21 U/L
ANION GAP SERPL CALC-SCNC: 14 MMOL/L
AST SERPL-CCNC: 20 U/L
BILIRUB SERPL-MCNC: 0.5 MG/DL
BUN SERPL-MCNC: 15 MG/DL
CALCIUM SERPL-MCNC: 10 MG/DL
CHLORIDE SERPL-SCNC: 103 MMOL/L
CHOLEST SERPL-MCNC: 253 MG/DL
CO2 SERPL-SCNC: 27 MMOL/L
CREAT SERPL-MCNC: 0.84 MG/DL
EGFR: 82 ML/MIN/1.73M2
ESTIMATED AVERAGE GLUCOSE: 134 MG/DL
GLUCOSE SERPL-MCNC: 104 MG/DL
HBA1C MFR BLD HPLC: 6.3 %
HDLC SERPL-MCNC: 65 MG/DL
LDLC SERPL CALC-MCNC: 171 MG/DL
NONHDLC SERPL-MCNC: 188 MG/DL
POTASSIUM SERPL-SCNC: 4.7 MMOL/L
PROT SERPL-MCNC: 7.2 G/DL
SODIUM SERPL-SCNC: 144 MMOL/L
TRIGL SERPL-MCNC: 84 MG/DL
TSH SERPL-ACNC: 0.94 UIU/ML

## 2023-06-06 ENCOUNTER — NON-APPOINTMENT (OUTPATIENT)
Age: 58
End: 2023-06-06

## 2023-06-06 ENCOUNTER — APPOINTMENT (OUTPATIENT)
Dept: OPHTHALMOLOGY | Facility: CLINIC | Age: 58
End: 2023-06-06
Payer: COMMERCIAL

## 2023-06-06 PROCEDURE — 92004 COMPRE OPH EXAM NEW PT 1/>: CPT

## 2023-07-21 ENCOUNTER — APPOINTMENT (OUTPATIENT)
Dept: MAMMOGRAPHY | Facility: CLINIC | Age: 58
End: 2023-07-21
Payer: COMMERCIAL

## 2023-07-21 ENCOUNTER — OUTPATIENT (OUTPATIENT)
Dept: OUTPATIENT SERVICES | Facility: HOSPITAL | Age: 58
LOS: 1 days | End: 2023-07-21
Payer: COMMERCIAL

## 2023-07-21 ENCOUNTER — APPOINTMENT (OUTPATIENT)
Dept: ULTRASOUND IMAGING | Facility: CLINIC | Age: 58
End: 2023-07-21
Payer: COMMERCIAL

## 2023-07-21 DIAGNOSIS — Z98.890 OTHER SPECIFIED POSTPROCEDURAL STATES: Chronic | ICD-10-CM

## 2023-07-21 DIAGNOSIS — Z00.8 ENCOUNTER FOR OTHER GENERAL EXAMINATION: ICD-10-CM

## 2023-07-21 PROCEDURE — 77063 BREAST TOMOSYNTHESIS BI: CPT

## 2023-07-21 PROCEDURE — 77067 SCR MAMMO BI INCL CAD: CPT | Mod: 26

## 2023-07-21 PROCEDURE — 76641 ULTRASOUND BREAST COMPLETE: CPT | Mod: 26,50

## 2023-07-21 PROCEDURE — 77063 BREAST TOMOSYNTHESIS BI: CPT | Mod: 26

## 2023-07-21 PROCEDURE — 77067 SCR MAMMO BI INCL CAD: CPT

## 2023-07-21 PROCEDURE — 76641 ULTRASOUND BREAST COMPLETE: CPT

## 2024-01-07 ENCOUNTER — EMERGENCY (EMERGENCY)
Facility: HOSPITAL | Age: 59
LOS: 1 days | Discharge: HOME | End: 2024-01-07
Attending: EMERGENCY MEDICINE
Payer: COMMERCIAL

## 2024-01-07 VITALS
OXYGEN SATURATION: 100 % | WEIGHT: 209.88 LBS | HEIGHT: 70 IN | TEMPERATURE: 99 F | DIASTOLIC BLOOD PRESSURE: 106 MMHG | RESPIRATION RATE: 18 BRPM | HEART RATE: 91 BPM | SYSTOLIC BLOOD PRESSURE: 162 MMHG

## 2024-01-07 DIAGNOSIS — Z98.890 OTHER SPECIFIED POSTPROCEDURAL STATES: Chronic | ICD-10-CM

## 2024-01-07 PROCEDURE — 99283 EMERGENCY DEPT VISIT LOW MDM: CPT

## 2024-01-07 PROCEDURE — 96372 THER/PROPH/DIAG INJ SC/IM: CPT

## 2024-01-07 RX ORDER — CYCLOBENZAPRINE HYDROCHLORIDE 10 MG/1
1 TABLET, FILM COATED ORAL
Qty: 15 | Refills: 0
Start: 2024-01-07 | End: 2024-01-11

## 2024-01-07 RX ORDER — KETOROLAC TROMETHAMINE 30 MG/ML
30 SYRINGE (ML) INJECTION ONCE
Refills: 0 | Status: DISCONTINUED | OUTPATIENT
Start: 2024-01-07 | End: 2024-01-07

## 2024-01-07 RX ORDER — CYCLOBENZAPRINE HYDROCHLORIDE 10 MG/1
10 TABLET, FILM COATED ORAL ONCE
Refills: 0 | Status: COMPLETED | OUTPATIENT
Start: 2024-01-07 | End: 2024-01-07

## 2024-01-07 RX ADMIN — Medication 30 MILLIGRAM(S): at 17:36

## 2024-01-07 RX ADMIN — CYCLOBENZAPRINE HYDROCHLORIDE 10 MILLIGRAM(S): 10 TABLET, FILM COATED ORAL at 17:36

## 2024-01-07 NOTE — ED PROVIDER NOTE - CLINICAL SUMMARY MEDICAL DECISION MAKING FREE TEXT BOX
57 y/o female with hx of calcific tendinosis of right shoulder presents to the ED c/o right shoulder pain. right shoulder pain, decreased range of the right shoulder, tenderness to right trapezius, likely muscle strain, Pt reassessed, pt feeling better at this time, vss, pt able to walk, talk and vocalized plan of action. Discussed in depth and explained to pt in depth the next steps that need to be taking including proper follow up with PCP or specialists. All incidental findings were discussed with pt as well. Pt verbalized their concerns and all questions were answered. Pt understands dispo and wants discharge. Given good instructions when to return to ED and importance of f/u. 59 y/o female with hx of calcific tendinosis of right shoulder presents to the ED c/o right shoulder pain. right shoulder pain, decreased range of the right shoulder, tenderness to right trapezius, likely muscle strain, Pt reassessed, pt feeling better at this time, vss, pt able to walk, talk and vocalized plan of action. Discussed in depth and explained to pt in depth the next steps that need to be taking including proper follow up with PCP or specialists. All incidental findings were discussed with pt as well. Pt verbalized their concerns and all questions were answered. Pt understands dispo and wants discharge. Given good instructions when to return to ED and importance of f/u.

## 2024-01-07 NOTE — ED ADULT TRIAGE NOTE - CHIEF COMPLAINT QUOTE
Pt c/o pain to right shoulder, had tendonitis in past in shoulder and feels similar, took ibuprofen and lido patch earlier, lifts heavy objects at work

## 2024-01-07 NOTE — ED ADULT NURSE NOTE - OBJECTIVE STATEMENT
Pt arrives to  c/o right shoulder pain. Pt states she has tolerated pain for "about a week". Pt states pain worsened last night and she was unable to sleep. No obvious deformities noted on assessment. VSS.

## 2024-01-07 NOTE — ED ADULT NURSE NOTE - NSFALLUNIVINTERV_ED_ALL_ED
Bed/Stretcher in lowest position, wheels locked, appropriate side rails in place/Call bell, personal items and telephone in reach/Instruct patient to call for assistance before getting out of bed/chair/stretcher/Non-slip footwear applied when patient is off stretcher/[place] to call system/Physically safe environment - no spills, clutter or unnecessary equipment/Purposeful proactive rounding/Room/bathroom lighting operational, light cord in reach Bed/Stretcher in lowest position, wheels locked, appropriate side rails in place/Call bell, personal items and telephone in reach/Instruct patient to call for assistance before getting out of bed/chair/stretcher/Non-slip footwear applied when patient is off stretcher/[place] to call system/Physically safe environment - no spills, clutter or unnecessary equipment/Purposeful proactive rounding/Room/bathroom lighting operational, light cord in reach

## 2024-01-07 NOTE — ED PROVIDER NOTE - ATTENDING APP SHARED VISIT CONTRIBUTION OF CARE
Sheryl: I performed a face to face bedside interview with patient regarding history of present illness, review of symptoms and past medical history. I completed an independent physical exam.  I have discussed patient's plan of care with advanced care provider.   I agree with note as stated above including HISTORY OF PRESENT ILLNESS, HIV, PAST MEDICAL/SURGICAL/FAMILY/SOCIAL HISTORY, ALLERGIES AND HOME MEDICATIONS, REVIEW OF SYSTEMS, PHYSICAL EXAM, MEDICAL DECISION MAKING and any PROGRESS NOTES during the time I functioned as the attending physician for this patient  unless otherwise noted. My brief assessment is as follows: hx tendonitis to shoulder, does heavy lifting c/o pain to left shoulder generalized. no ivy ttp. no erythema/warmth, no f/c. neurovascularly intact. can range but pain with extension. supportive care, f/u ortho.

## 2024-01-07 NOTE — ED PROVIDER NOTE - CARE PROVIDER_API CALL
Daryl Renee  Orthopaedic Surgery  [address], [address]  [place] [address]  Phone: [phone]  Fax: [phone]  Follow Up Time:    Daryl Renee  Orthopaedic Surgery  [address], [address]  [place] [address]  Phone: [phone]  Fax: [phone]  Follow Up Time:

## 2024-01-07 NOTE — ED PROVIDER NOTE - OBJECTIVE STATEMENT
59 y/o female with hx of calcific tendosis of right shoulder presents to the ED c/o right shoulder pain. pt notes she had similar pain in the past and was diagnosed with calcific tendinosis. Pt notes she had been using her shoulder more frequently recently. admits she has pain with movement. No injury to the shoulder. Denies chest pain, sob, lightheadedness, dizziness.

## 2024-01-07 NOTE — ED PROVIDER NOTE - PHYSICAL EXAMINATION
General-alert and oriented to person place and time, nontoxic appearing, pleasant cooperative, NAD  HEENT-normocephalic, atraumatic, NT to palp, EOMI, PERRLA, no conjunctival injections, nares patent,  Chest- Nt to palp, no reproducible pain  Cardio-s1,s2 present, regular rate and rhythm  Resp- talks in full sentences, symmetrical chest rise, CTA bilat, no evidence of wheezes, rhonchi noted  Abdomen- bowel sounds presnt in all 4 quadrants, soft, NT/ND, no guarding, no rebound tenderness  MSK- moves all extremities, decreased range of motion of the right shoulder, tender to right trapezius,  pulses- 2+ RP  Back- nt to palp of cervical, thoracic, lumbar spine, nt to palp of paraspinal m., No CVA tenderness  Neuro- no focal deficits, sensation intact

## 2024-01-07 NOTE — ED PROVIDER NOTE - PATIENT PORTAL LINK FT
You can access the FollowMyHealth Patient Portal offered by [facility] by registering at the following website: [email or web address]. By joining [facility]’s FollowMyHealth portal, you will also be able to view your health information using other applications (apps) compatible with our system. You can access the FollowMyHealth Patient Portal offered by [facility] by registering at the following website: [email or web address]. By joining [facility]’s FollowMyHealth portal, you will also be able to view your health information using other applications (apps) compatible with our system.

## 2024-01-11 ENCOUNTER — APPOINTMENT (OUTPATIENT)
Dept: RADIOLOGY | Facility: CLINIC | Age: 59
End: 2024-01-11
Payer: COMMERCIAL

## 2024-01-11 ENCOUNTER — APPOINTMENT (OUTPATIENT)
Dept: PHYSICAL MEDICINE AND REHAB | Facility: CLINIC | Age: 59
End: 2024-01-11
Payer: COMMERCIAL

## 2024-01-11 ENCOUNTER — OUTPATIENT (OUTPATIENT)
Dept: OUTPATIENT SERVICES | Facility: HOSPITAL | Age: 59
LOS: 1 days | End: 2024-01-11
Payer: COMMERCIAL

## 2024-01-11 VITALS
DIASTOLIC BLOOD PRESSURE: 83 MMHG | HEART RATE: 95 BPM | SYSTOLIC BLOOD PRESSURE: 131 MMHG | HEIGHT: 69 IN | RESPIRATION RATE: 14 BRPM | WEIGHT: 200 LBS | BODY MASS INDEX: 29.62 KG/M2

## 2024-01-11 DIAGNOSIS — M75.31 CALCIFIC TENDINITIS OF RIGHT SHOULDER: ICD-10-CM

## 2024-01-11 DIAGNOSIS — K21.9 GASTRO-ESOPHAGEAL REFLUX DISEASE W/OUT ESOPHAGITIS: ICD-10-CM

## 2024-01-11 DIAGNOSIS — Z98.890 OTHER SPECIFIED POSTPROCEDURAL STATES: Chronic | ICD-10-CM

## 2024-01-11 DIAGNOSIS — M75.81 OTHER SHOULDER LESIONS, RIGHT SHOULDER: ICD-10-CM

## 2024-01-11 DIAGNOSIS — M25.511 PAIN IN RIGHT SHOULDER: ICD-10-CM

## 2024-01-11 PROCEDURE — 73030 X-RAY EXAM OF SHOULDER: CPT

## 2024-01-11 PROCEDURE — 99204 OFFICE O/P NEW MOD 45 MIN: CPT

## 2024-01-11 PROCEDURE — 73030 X-RAY EXAM OF SHOULDER: CPT | Mod: 26,RT

## 2024-01-11 RX ORDER — CELECOXIB 200 MG/1
200 CAPSULE ORAL
Qty: 42 | Refills: 0 | Status: DISCONTINUED | COMMUNITY
Start: 2022-12-27 | End: 2024-01-11

## 2024-01-11 RX ORDER — CEFADROXIL 500 MG/1
500 CAPSULE ORAL TWICE DAILY
Qty: 14 | Refills: 0 | Status: DISCONTINUED | COMMUNITY
Start: 2023-03-28 | End: 2024-01-11

## 2024-01-11 RX ORDER — OXYCODONE 5 MG/1
5 TABLET ORAL
Qty: 28 | Refills: 0 | Status: DISCONTINUED | COMMUNITY
Start: 2023-01-17 | End: 2024-01-11

## 2024-01-11 RX ORDER — CEPHALEXIN 500 MG/1
500 TABLET ORAL 4 TIMES DAILY
Qty: 12 | Refills: 0 | Status: DISCONTINUED | COMMUNITY
Start: 2022-12-27 | End: 2024-01-11

## 2024-01-11 RX ORDER — OXYCODONE 5 MG/1
5 TABLET ORAL
Qty: 42 | Refills: 0 | Status: DISCONTINUED | COMMUNITY
Start: 2022-12-27 | End: 2024-01-11

## 2024-01-11 RX ORDER — NAPROXEN 500 MG/1
500 TABLET ORAL
Refills: 0 | Status: ACTIVE | COMMUNITY

## 2024-01-11 RX ORDER — PANTOPRAZOLE 40 MG/1
40 TABLET, DELAYED RELEASE ORAL
Qty: 42 | Refills: 0 | Status: DISCONTINUED | COMMUNITY
Start: 2022-12-27 | End: 2024-01-11

## 2024-01-11 RX ORDER — ASPIRIN/ACETAMINOPHEN/CAFFEINE 500-325-65
325 POWDER IN PACKET (EA) ORAL
Qty: 84 | Refills: 0 | Status: DISCONTINUED | COMMUNITY
Start: 2022-12-27 | End: 2024-01-11

## 2024-01-11 RX ORDER — ASPIRIN 325 MG/1
325 TABLET, COATED ORAL
Qty: 60 | Refills: 1 | Status: DISCONTINUED | COMMUNITY
Start: 2023-01-25 | End: 2024-01-11

## 2024-01-11 RX ORDER — ACETAMINOPHEN 500 MG/1
500 TABLET, COATED ORAL
Qty: 90 | Refills: 0 | Status: DISCONTINUED | COMMUNITY
Start: 2022-12-27 | End: 2024-01-11

## 2024-01-11 NOTE — PHYSICAL EXAM
[FreeTextEntry1] : PE: Constitutional: In NAD, calm and cooperative MSK (Shoulders): 	Inspection: no gross swelling identified 	Palpation: Mild tenderness over anterior/posterior shoulder 	ROM: AROM of IR, ER and Shoulder Abduction intact but pain at end abduction and IR 	Strength: 5/5 strength in bilateral upper extremities 	Reflexes: 2+ Biceps/Triceps/Brachioradialis reflex bilaterally, Hoffmans negative bilaterally 	Sensation: Intact to light touch in bilateral upper extremities Special tests:  Neer's test: positive on R Hawkin's sign: positive on R Speed's test: negative on R Scarf Sign: positive on R

## 2024-01-11 NOTE — ASSESSMENT
[FreeTextEntry1] : Ms. RADHA DURAND is a 58 year old female who presents with R shoulder pain, likely due to exacerbation of her underlying calcific tendinitis. Denies any radicular pain or numbness/tingling. Will recommend: - Previous X-ray reviewed - Will order new X-ray given worsening of pain, will call with results - Advised her to continue occasional cyclobenzaprine for now, aware of side effects including sedation. Advised avoidance of Naproxen given history of GERD. Advised she can try Voltaren instead.  - Spoke about R/B/A of a CSI for which we will see if she improves with conservative management first.   RTC in 2 weeks if pain continues. Patient aware of red flag signs including any changes to their bowel/bladder control, groin numbness or new weakness. Patient knows to seek immediate attention by calling 911 or going to nearest ER if these symptoms appear.

## 2024-01-11 NOTE — DATA REVIEWED
[FreeTextEntry1] : X-ray R Shoulder 8/8/21 reviewed and interpreted by me: evidence of calcific tendonitis  EXAM: XR SHOULDER COMP MIN 2V RT  PROCEDURE DATE: 08/08/2021    INTERPRETATION: Clinical history: 55-year-old female, injury while bowling.  Three views of the right shoulder are compared to 5/18/2015 and demonstrate no fracture, dislocation or significant degenerative change.  Periarticular calcifications most consistent with calcific tendinosis, increased.  IMPRESSION: Periarticular calcifications most consistent with calcific tendinosis, increased  --- End of Report ---      SD MOSS DO; Attending Radiologist This document has been electronically signed. Aug 8 2021 2:49PM Notes   R Shoulder X-ray 1/30/20 reviewed and interpreted by me: evidence of calcific tendonitis seen

## 2024-01-11 NOTE — HISTORY OF PRESENT ILLNESS
[FreeTextEntry1] : Ms. RADHA DURAND  is a 58 year old female with a PMHx of GERD who presents with shoulder pain.  Location: R Shoulder Onset: Chronic, worsening acute pain (no inciting event) over last week or so Provocation/Palliative: Worse with abduction of R shoulder, better with cyclobenzaprine Quality:Achy, can be sharp Radiation: None Severity: moderate Timing: slightly improved with cyclobenzaprine/naproxen  Denies any associated numbness. Denies any associated arm or hand weakness Previous medications trialed: Naproxen, Muscle relaxant Previous procedures relevant to complaint: Had CSI in past with good relief Has tried conservative treatment?: No recent PT

## 2024-02-14 NOTE — ED STATDOCS - INTERNATIONAL TRAVEL
Preventive Care Visit  [facility]  Luna Reynaga MD, Family Medicine  Feb 14, 2024    Assessment & Plan   16 year old 2 month old, here for preventive care.    (Z00.129) Encounter for routine child health examination w/o abnormal findings  (primary encounter diagnosis)  Comment: see below  Plan: BEHAVIORAL/EMOTIONAL ASSESSMENT (67667)            (J45.20) Mild intermittent asthma without complication  Comment: rare episodes  Plan: albuterol (PROAIR HFA/PROVENTIL HFA/VENTOLIN         HFA) 108 (90 Base) MCG/ACT inhaler        Can consider singulair for both allergy symptoms and EIA/intermittent asthma  Will trial daily OTC antihistamine for suspected allergy symptoms      Growth      Normal height and weight    Immunizations   Appropriate vaccinations were ordered.    Anticipatory Guidance    Reviewed age appropriate anticipatory guidance.   SOCIAL/ FAMILY:    Peer pressure    School/ homework  NUTRITION:    Healthy food choices    Family meals  HEALTH/ SAFETY:    Adequate sleep/ exercise    Sunscreen/ insect repellent  SEXUALITY:    Menstruation    Cleared for sports:  Yes    Referrals/Ongoing Specialty Care  None  Verbal Dental Referral: Patient has established dental home  Dental Fluoride Varnish:   No, parent/guardian declines fluoride varnish.  Reason for decline: Patient/Parental preference    Dyslipidemia Follow Up:  Discussed nutrition      Subjective   Asuncion is presenting for the following:  Well Child      Getting reactions to dog when scratched, redness and small bumps occur, itch, resolve over time.  Does have some runny nose.  Has h/o intermittent asthma/exercise induced asthma. Occas albuterol use.      2/14/2024     8:26 AM   Additional Questions   Accompanied by Mom   Questions for today's visit No   Surgery, major illness, or injury since last physical Yes         2/13/2024   Social   Lives with Parent(s)    Sibling(s)   Recent potential stressors None   History of trauma No  "  Family Hx of mental health challenges (!) YES   Lack of transportation has limited access to appts/meds No   Do you have housing?  Yes   Are you worried about losing your housing? No         2/13/2024     9:59 AM   Health Risks/Safety   Does your adolescent always wear a seat belt? Yes   Helmet use? Yes   Do you have guns/firearms in the home? (!) YES   Are the guns/firearms secured in a safe or with a trigger lock? Yes   Is ammunition stored separately from guns? Yes         2/13/2024     9:59 AM   TB Screening   Was your adolescent born outside of the [place]? No         2/13/2024     9:59 AM   TB Screening: Consider immunosuppression as a risk factor for TB   Recent TB infection or positive TB test in family/close contacts No   Recent travel outside [place] (child/family/close contacts) No   Recent residence in high-risk group setting (correctional facility/health care facility/homeless shelter/refugee camp) No          2/13/2024     9:59 AM   Dyslipidemia   FH: premature cardiovascular disease (!) PARENT   FH: hyperlipidemia (!) YES   Personal risk factors for heart disease NO diabetes, high blood pressure, obesity, smokes cigarettes, kidney problems, heart or kidney transplant, history of Kawasaki disease with an aneurysm, lupus, rheumatoid arthritis, or HIV     No results for input(s): \"CHOL\", \"HDL\", \"LDL\", \"TRIG\", \"CHOLHDLRATIO\" in the last 84745 hours.        2/13/2024     9:59 AM   Sudden Cardiac Arrest and Sudden Cardiac Death Screening   History of syncope/seizure No   History of exercise-related chest pain or shortness of breath (!) YES   FH: premature death (sudden/unexpected or other) attributable to heart diseases No   FH: cardiomyopathy, ion channelopothy, Marfan syndrome, or arrhythmia No         2/13/2024     9:59 AM   Dental Screening   Has your adolescent seen a dentist? Yes   When was the last visit? 3 months to 6 months ago   Has your adolescent had cavities in the last 3 years? (!) YES- " 1-2 CAVITIES IN THE LAST 3 YEARS- MODERATE RISK   Has your adolescent s parent(s), caregiver, or sibling(s) had any cavities in the last 2 years?  No         2/13/2024   Diet   Do you have questions about your adolescent's eating?  No   Do you have questions about your adolescent's height or weight? No   What does your adolescent regularly drink? Water   How often does your family eat meals together? Most days   Servings of fruits/vegetables per day (!) 1-2   At least 3 servings of food or beverages that have calcium each day? (!) NO   In past 12 months, concerned food might run out No   In past 12 months, food has run out/couldn't afford more No           2/13/2024   Activity   Days per week of moderate/strenuous exercise 1 day   On average, how many minutes do you engage in exercise at this level? 20 min   What does your adolescent do for exercise?  walking   What activities is your adolescent involved with?  art/drawing/painting, sports with friends (no team sports currently)         2/13/2024     9:59 AM   Media Use   Hours per day of screen time (for entertainment) 3?   Screen in bedroom (!) YES         2/13/2024     9:59 AM   Sleep   Does your adolescent have any trouble with sleep? No    (!) DIFFICULTY FALLING ASLEEP   Daytime sleepiness/naps No         2/13/2024     9:59 AM   School   School concerns No concerns   Grade in school 8th Grade   Current school [employer]   School absences (>2 days/mo) No         2/13/2024     9:59 AM   Vision/Hearing   Vision or hearing concerns No concerns         2/13/2024     9:59 AM   Development / Social-Emotional Screen   Developmental concerns No     Psycho-Social/Depression - PSC-17 required for C&TC through age 18  General screening:  Electronic PSC       2/13/2024     9:59 AM   PSC SCORES   Inattentive / Hyperactive Symptoms Subtotal 0   Externalizing Symptoms Subtotal 0   Internalizing Symptoms Subtotal 2   PSC - 17 Total Score 2       Follow up:  PSC-17  PASS (total score <15; attention symptoms <7, externalizing symptoms <7, internalizing symptoms <5)  no follow up necessary  Teen Screen    Teen Screen completed, reviewed and scanned document within chart        2024     9:59 AM   [facility] MENSES SECTION   What are your adolescent's periods like?  Regular    Light flow    Medium flow         2024     9:59 AM   [employer] [employer] Sports Physical   Has a provider ever denied or restricted your participation in sports for any reason? (!) YES left jerod yoon, fixation, 3/2023, currently no issues   Do you have any ongoing medical issues or recent illness? No   Have you ever passed out or nearly passed out during or after exercise? No   Have you ever had discomfort, pain, tightness, or pressure in your chest during exercise? No   Does your heart ever race, flutter in your chest, or skip beats (irregular beats) during exercise? No   Has a doctor ever told you that you have any heart problems? No   Has a doctor ever requested a test for your heart? For example, electrocardiography (ECG) or echocardiography. No   Do you ever get light-headed or feel shorter of breath than your friends during exercise?  (!) YES - intermittent asthma   Have you ever had a seizure?  No   Has any family member or relative  of heart problems or had an unexpected or unexplained sudden death before age 35 years (including drowning or unexplained car crash)? No   Does anyone in your family have a genetic heart problem such as hypertrophic cardiomyopathy (HCM), Marfan syndrome, arrhythmogenic right ventricular cardiomyopathy (ARVC), long QT syndrome (LQTS), short QT syndrome (SQTS), Brugada syndrome, or catecholaminergic polymorphic ventricular tachycardia (CPVT)?   No   Has anyone in your family had a pacemaker or an implanted defibrillator before age 35? No   Have you ever had a stress fracture or an injury to a bone, muscle, ligament, joint, or tendon that caused you to miss a  "practice or game? (!) YES see #1   Do you have a bone, muscle, ligament, or joint injury that bothers you?  (!) YES see #1   Do you cough, wheeze, or have difficulty breathing during or after exercise?   (!) YES see #8   Are you missing a kidney, an eye, a testicle (males), your spleen, or any other organ? No   Do you have groin or testicle pain or a painful bulge or hernia in the groin area? No   Do you have any recurring skin rashes or rashes that come and go, including herpes or methicillin-resistant Staphylococcus aureus (MRSA)? No   Have you had a concussion or head injury that caused confusion, a prolonged headache, or memory problems? No   Have you ever had numbness, tingling, weakness in your arms or legs, or been unable to move your arms or legs after being hit or falling? No   Have you ever become ill while exercising in the heat? No   Do you or does someone in your family have sickle cell trait or disease? No   Have you ever had, or do you have any problems with your eyes or vision? No   Do you worry about your weight? No   Are you trying to or has anyone recommended that you gain or lose weight? No   Are you on a special diet or do you avoid certain types of foods or food groups? No   Have you ever had an eating disorder? No   Have you ever had a menstrual period? Yes   How old were you when you had your first menstrual period? 14   When was your most recent menstrual period? 2 weeks ago   How many periods have you had in the past 12 months? 12          Objective     Exam  /76   Pulse 82   Temp 97.1  F (36.2  C) (Tympanic)   Resp 16   Ht 1.745 m (5' 8.7\")   Wt 55.7 kg (122 lb 12.8 oz)   LMP 01/30/2024 (Exact Date)   SpO2 98%   BMI 18.29 kg/m    98 %ile (Z= 2.08) based on CDC (Girls, 2-20 Years) Stature-for-age data based on Stature recorded on 2/14/2024.  70 %ile (Z= 0.54) based on CDC (Girls, 2-20 Years) weight-for-age data using vitals from 2/14/2024.  33 %ile (Z= -0.44) based on CDC " (Girls, 2-20 Years) BMI-for-age based on BMI available as of 2/14/2024.  Blood pressure %channing are 67% systolic and 87% diastolic based on the 2017 AAP Clinical Practice Guideline. This reading is in the normal blood pressure range.    Physical Exam  GENERAL: Active, alert, in no acute distress.  SKIN: Clear. No significant rash, abnormal pigmentation or lesions  HEAD: Normocephalic  EYES: Pupils equal, round, reactive, Extraocular muscles intact. Normal conjunctivae.  EARS: Normal canals. Tympanic membranes are normal; gray and translucent.  NOSE: Normal without discharge.  MOUTH/THROAT: Clear. No oral lesions. Teeth without obvious abnormalities.  NECK: Supple, no masses.  No thyromegaly.  LYMPH NODES: No adenopathy  LUNGS: Clear. No rales, rhonchi, wheezing or retractions  HEART: Regular rhythm. Normal S1/S2. No murmurs. Normal pulses.  ABDOMEN: Soft, non-tender, not distended, no masses or hepatosplenomegaly. Bowel sounds normal.   NEUROLOGIC: No focal findings. Cranial nerves grossly intact: DTR's normal. Normal gait, strength and tone  BACK: Spine is straight, no scoliosis.  EXTREMITIES: Full range of motion, no deformities  : Exam declined by parent/patient.  Reason for decline: Patient/Parental preference     No Marfan stigmata: kyphoscoliosis, high-arched palate, pectus excavatuM, arachnodactyly, arm span > height, hyperlaxity, myopia, MVP, aortic insufficieny)  Eyes: normal fundoscopic and pupils  Cardiovascular: normal PMI, simultaneous femoral/radial pulses, no murmurs (standing, supine, Valsalva)  Skin: no HSV, MRSA, tinea corporis  Musculoskeletal    Neck: normal    Back: normal    Shoulder/arm: normal    Elbow/forearm: normal    Wrist/hand/fingers: normal    Hip/thigh: normal    Knee: normal    Leg/ankle: normal    Foot/toes: normal    Functional (Single Leg Hop or Squat): normal      Signed Electronically by: Luna Reynaga MD     No

## 2024-06-14 ENCOUNTER — APPOINTMENT (OUTPATIENT)
Dept: FAMILY MEDICINE | Facility: CLINIC | Age: 59
End: 2024-06-14
Payer: COMMERCIAL

## 2024-06-14 ENCOUNTER — LABORATORY RESULT (OUTPATIENT)
Age: 59
End: 2024-06-14

## 2024-06-14 ENCOUNTER — NON-APPOINTMENT (OUTPATIENT)
Age: 59
End: 2024-06-14

## 2024-06-14 VITALS
HEART RATE: 95 BPM | WEIGHT: 205.6 LBS | SYSTOLIC BLOOD PRESSURE: 122 MMHG | DIASTOLIC BLOOD PRESSURE: 86 MMHG | BODY MASS INDEX: 30.45 KG/M2 | OXYGEN SATURATION: 95 % | HEIGHT: 69 IN | TEMPERATURE: 206.6 F

## 2024-06-14 DIAGNOSIS — Z13.220 ENCOUNTER FOR SCREENING FOR LIPOID DISORDERS: ICD-10-CM

## 2024-06-14 DIAGNOSIS — Z12.11 ENCOUNTER FOR SCREENING FOR MALIGNANT NEOPLASM OF COLON: ICD-10-CM

## 2024-06-14 DIAGNOSIS — R06.2 WHEEZING: ICD-10-CM

## 2024-06-14 DIAGNOSIS — Z13.0 ENCOUNTER FOR SCREENING FOR DISEASES OF THE BLOOD AND BLOOD-FORMING ORGANS AND CERTAIN DISORDERS INVOLVING THE IMMUNE MECHANISM: ICD-10-CM

## 2024-06-14 DIAGNOSIS — Z13.29 ENCOUNTER FOR SCREENING FOR OTHER SUSPECTED ENDOCRINE DISORDER: ICD-10-CM

## 2024-06-14 DIAGNOSIS — Z13.6 ENCOUNTER FOR SCREENING FOR CARDIOVASCULAR DISORDERS: ICD-10-CM

## 2024-06-14 DIAGNOSIS — J30.2 OTHER SEASONAL ALLERGIC RHINITIS: ICD-10-CM

## 2024-06-14 DIAGNOSIS — L29.9 PRURITUS, UNSPECIFIED: ICD-10-CM

## 2024-06-14 PROCEDURE — 99396 PREV VISIT EST AGE 40-64: CPT

## 2024-06-14 PROCEDURE — 93000 ELECTROCARDIOGRAM COMPLETE: CPT

## 2024-06-14 RX ORDER — ALBUTEROL SULFATE 90 UG/1
108 (90 BASE) INHALANT RESPIRATORY (INHALATION)
Qty: 1 | Refills: 0 | Status: ACTIVE | COMMUNITY
Start: 2024-06-14 | End: 1900-01-01

## 2024-06-14 RX ORDER — FLUOCINOLONE ACETONIDE 0.11 MG/ML
0.01 OIL AURICULAR (OTIC) DAILY
Qty: 1 | Refills: 0 | Status: ACTIVE | COMMUNITY
Start: 2024-06-14 | End: 1900-01-01

## 2024-06-14 RX ORDER — MONTELUKAST 10 MG/1
10 TABLET, FILM COATED ORAL
Qty: 30 | Refills: 0 | Status: ACTIVE | COMMUNITY
Start: 2024-06-14 | End: 1900-01-01

## 2024-06-14 NOTE — HISTORY OF PRESENT ILLNESS
[FreeTextEntry1] : CPE [de-identified] : 58 year old female presents for CPE She complains of intermittent wheezing. Does not happen every day. Feels like she has to cough but does not cough anything up.  She does admit to allergies and would like an allergy panel.

## 2024-06-14 NOTE — PLAN
[FreeTextEntry1] : Wheezing - f/u CXR - trial of singulair at night - albuterol PRN - pulm for PFTs  Ear itching - trial of otic oil   HCM - f/u labs - healthy diet and exercise - due for mammo - has not had colonoscopy in many years

## 2024-06-14 NOTE — HEALTH RISK ASSESSMENT
[Yes] : Yes [No] : In the past 12 months have you used drugs other than those required for medical reasons? No [0] : 2) Feeling down, depressed, or hopeless: Not at all (0) [PHQ-2 Negative - No further assessment needed] : PHQ-2 Negative - No further assessment needed [Former] : Former [5-9] : 5-9 [< 15 Years] : < 15 Years [NO] : No [Patient reported mammogram was normal] : Patient reported mammogram was normal [Patient reported PAP Smear was normal] : Patient reported PAP Smear was normal [de-identified] : weekends  [de-identified] : walks at night  [de-identified] : trying to eat healthier  [[id number]] : 0 [de-identified] : social smoker, quit 12 years ago [MammogramDate] : 07/23 [PapSmearDate] : 05/23

## 2024-06-14 NOTE — PHYSICAL EXAM
[No Acute Distress] : no acute distress [Well-Appearing] : well-appearing [Normal Sclera/Conjunctiva] : normal sclera/conjunctiva [PERRL] : pupils equal round and reactive to light [Normal Outer Ear/Nose] : the outer ears and nose were normal in appearance [Normal TMs] : both tympanic membranes were normal [No Lymphadenopathy] : no lymphadenopathy [Supple] : supple [No Respiratory Distress] : no respiratory distress  [No Accessory Muscle Use] : no accessory muscle use [Normal Rate] : normal rate  [Regular Rhythm] : with a regular rhythm [Soft] : abdomen soft [Non Tender] : non-tender [Non-distended] : non-distended [Normal Bowel Sounds] : normal bowel sounds [No Rash] : no rash [No Focal Deficits] : no focal deficits [Normal Affect] : the affect was normal [Normal Insight/Judgement] : insight and judgment were intact [de-identified] : diffuse mild wheeze

## 2024-06-17 DIAGNOSIS — J30.81 ALLERGIC RHINITIS DUE TO ANIMAL (CAT) (DOG) HAIR AND DANDER: ICD-10-CM

## 2024-06-17 LAB
A ALTERNATA IGE QN: <0.1 KUA/L
A FUMIGATUS IGE QN: <0.1 KUA/L
ALBUMIN SERPL ELPH-MCNC: 4.3 G/DL
ALP BLD-CCNC: 63 U/L
ALT SERPL-CCNC: 19 U/L
ANION GAP SERPL CALC-SCNC: 13 MMOL/L
AST SERPL-CCNC: 18 U/L
BASOPHILS # BLD AUTO: 0.07 K/UL
BASOPHILS NFR BLD AUTO: 0.9 %
BERMUDA GRASS IGE QN: <0.1 KUA/L
BILIRUB SERPL-MCNC: 0.6 MG/DL
BOXELDER IGE QN: <0.1 KUA/L
BUN SERPL-MCNC: 10 MG/DL
C HERBARUM IGE QN: <0.1 KUA/L
CALCIUM SERPL-MCNC: 9.4 MG/DL
CALIF WALNUT IGE QN: <0.1 KUA/L
CAT DANDER IGE QN: 16 KUA/L
CHLORIDE SERPL-SCNC: 104 MMOL/L
CHOLEST SERPL-MCNC: 225 MG/DL
CMN PIGWEED IGE QN: <0.1 KUA/L
CO2 SERPL-SCNC: 24 MMOL/L
COMMON RAGWEED IGE QN: <0.1 KUA/L
COTTONWOOD IGE QN: <0.1 KUA/L
CREAT SERPL-MCNC: 0.76 MG/DL
D FARINAE IGE QN: <0.1 KUA/L
D PTERONYSS IGE QN: <0.1 KUA/L
DEPRECATED A ALTERNATA IGE RAST QL: 0 (ref 0–?)
DEPRECATED A FUMIGATUS IGE RAST QL: 0 (ref 0–?)
DEPRECATED BERMUDA GRASS IGE RAST QL: 0 (ref 0–?)
DEPRECATED BOXELDER IGE RAST QL: 0 (ref 0–?)
DEPRECATED C HERBARUM IGE RAST QL: 0 (ref 0–?)
DEPRECATED CAT DANDER IGE RAST QL: 3 (ref 0–?)
DEPRECATED COMMON PIGWEED IGE RAST QL: 0 (ref 0–?)
DEPRECATED COMMON RAGWEED IGE RAST QL: 0 (ref 0–?)
DEPRECATED COTTONWOOD IGE RAST QL: 0 (ref 0–?)
DEPRECATED D FARINAE IGE RAST QL: 0 (ref 0–?)
DEPRECATED D PTERONYSS IGE RAST QL: 0 (ref 0–?)
DEPRECATED DOG DANDER IGE RAST QL: 2 (ref 0–?)
DEPRECATED GOOSEFOOT IGE RAST QL: 0 (ref 0–?)
DEPRECATED LONDON PLANE IGE RAST QL: 0 (ref 0–?)
DEPRECATED MOUSE URINE PROT IGE RAST QL: 0 (ref 0–?)
DEPRECATED MUGWORT IGE RAST QL: 0 (ref 0–?)
DEPRECATED P NOTATUM IGE RAST QL: 0 (ref 0–?)
DEPRECATED RED CEDAR IGE RAST QL: 0 (ref 0–?)
DEPRECATED ROACH IGE RAST QL: 0 (ref 0–?)
DEPRECATED SHEEP SORREL IGE RAST QL: 0 (ref 0–?)
DEPRECATED SILVER BIRCH IGE RAST QL: 0 (ref 0–?)
DEPRECATED TIMOTHY IGE RAST QL: 0 (ref 0–?)
DEPRECATED WHITE ASH IGE RAST QL: 0 (ref 0–?)
DEPRECATED WHITE OAK IGE RAST QL: 0 (ref 0–?)
DOG DANDER IGE QN: 2.33 KUA/L
EGFR: 92 ML/MIN/1.73M2
EOSINOPHIL # BLD AUTO: 0.46 K/UL
EOSINOPHIL NFR BLD AUTO: 6.2 %
ESTIMATED AVERAGE GLUCOSE: 117 MG/DL
GLUCOSE SERPL-MCNC: 99 MG/DL
GOOSEFOOT IGE QN: <0.1 KUA/L
HBA1C MFR BLD HPLC: 5.7 %
HCT VFR BLD CALC: 40.7 %
HDLC SERPL-MCNC: 60 MG/DL
HGB BLD-MCNC: 13.3 G/DL
IMM GRANULOCYTES NFR BLD AUTO: 0.4 %
LDLC SERPL CALC-MCNC: 149 MG/DL
LONDON PLANE IGE QN: <0.1 KUA/L
LYMPHOCYTES # BLD AUTO: 1.92 K/UL
LYMPHOCYTES NFR BLD AUTO: 25.8 %
MAN DIFF?: NORMAL
MCHC RBC-ENTMCNC: 30.6 PG
MCHC RBC-ENTMCNC: 32.7 GM/DL
MCV RBC AUTO: 93.6 FL
MONOCYTES # BLD AUTO: 0.61 K/UL
MONOCYTES NFR BLD AUTO: 8.2 %
MOUSE URINE PROT IGE QN: <0.1 KUA/L
MUGWORT IGE QN: <0.1 KUA/L
MULBERRY (T70) CLASS: 0 (ref 0–?)
MULBERRY (T70) CONC: <0.1 KUA/L
NEUTROPHILS # BLD AUTO: 4.34 K/UL
NEUTROPHILS NFR BLD AUTO: 58.5 %
NONHDLC SERPL-MCNC: 166 MG/DL
P NOTATUM IGE QN: <0.1 KUA/L
PLATELET # BLD AUTO: 258 K/UL
POTASSIUM SERPL-SCNC: 4.5 MMOL/L
PROT SERPL-MCNC: 6.8 G/DL
RBC # BLD: 4.35 M/UL
RBC # FLD: 14 %
RED CEDAR IGE QN: <0.1 KUA/L
ROACH IGE QN: <0.1 KUA/L
SHEEP SORREL IGE QN: <0.1 KUA/L
SILVER BIRCH IGE QN: <0.1 KUA/L
SODIUM SERPL-SCNC: 141 MMOL/L
TIMOTHY IGE QN: <0.1 KUA/L
TOTAL IGE SMQN RAST: 54 KU/L
TREE ALLERG MIX1 IGE QL: 0 (ref 0–?)
TRIGL SERPL-MCNC: 93 MG/DL
TSH SERPL-ACNC: 0.97 UIU/ML
WBC # FLD AUTO: 7.43 K/UL
WHITE ASH IGE QN: <0.1 KUA/L
WHITE ELM IGE QN: 0 (ref 0–?)
WHITE ELM IGE QN: <0.1 KUA/L
WHITE OAK IGE QN: <0.1 KUA/L

## 2024-06-18 ENCOUNTER — TRANSCRIPTION ENCOUNTER (OUTPATIENT)
Age: 59
End: 2024-06-18

## 2024-07-26 ENCOUNTER — APPOINTMENT (OUTPATIENT)
Dept: RADIOLOGY | Facility: CLINIC | Age: 59
End: 2024-07-26
Payer: COMMERCIAL

## 2024-07-26 ENCOUNTER — APPOINTMENT (OUTPATIENT)
Dept: ULTRASOUND IMAGING | Facility: CLINIC | Age: 59
End: 2024-07-26
Payer: COMMERCIAL

## 2024-07-26 ENCOUNTER — APPOINTMENT (OUTPATIENT)
Dept: MAMMOGRAPHY | Facility: CLINIC | Age: 59
End: 2024-07-26
Payer: COMMERCIAL

## 2024-07-26 PROCEDURE — 71046 X-RAY EXAM CHEST 2 VIEWS: CPT | Mod: 26

## 2024-07-26 PROCEDURE — 77063 BREAST TOMOSYNTHESIS BI: CPT | Mod: 26

## 2024-07-26 PROCEDURE — 76641 ULTRASOUND BREAST COMPLETE: CPT | Mod: 26,50

## 2024-07-26 PROCEDURE — 77067 SCR MAMMO BI INCL CAD: CPT | Mod: 26

## 2024-07-26 PROCEDURE — 77080 DXA BONE DENSITY AXIAL: CPT | Mod: 26

## 2024-08-13 ENCOUNTER — APPOINTMENT (OUTPATIENT)
Dept: PEDIATRIC ALLERGY IMMUNOLOGY | Facility: CLINIC | Age: 59
End: 2024-08-13

## 2024-10-31 ENCOUNTER — APPOINTMENT (OUTPATIENT)
Dept: PEDIATRIC ALLERGY IMMUNOLOGY | Facility: CLINIC | Age: 59
End: 2024-10-31
Payer: COMMERCIAL

## 2024-10-31 ENCOUNTER — NON-APPOINTMENT (OUTPATIENT)
Age: 59
End: 2024-10-31

## 2024-10-31 VITALS
HEIGHT: 69 IN | TEMPERATURE: 206.96 F | WEIGHT: 203 LBS | HEART RATE: 104 BPM | DIASTOLIC BLOOD PRESSURE: 80 MMHG | SYSTOLIC BLOOD PRESSURE: 140 MMHG | OXYGEN SATURATION: 97 % | BODY MASS INDEX: 30.07 KG/M2

## 2024-10-31 DIAGNOSIS — H10.10 ACUTE ATOPIC CONJUNCTIVITIS, UNSPECIFIED EYE: ICD-10-CM

## 2024-10-31 DIAGNOSIS — J30.81 ALLERGIC RHINITIS DUE TO ANIMAL (CAT) (DOG) HAIR AND DANDER: ICD-10-CM

## 2024-10-31 DIAGNOSIS — R06.2 WHEEZING: ICD-10-CM

## 2024-10-31 PROCEDURE — 99204 OFFICE O/P NEW MOD 45 MIN: CPT | Mod: 25

## 2024-10-31 PROCEDURE — 94010 BREATHING CAPACITY TEST: CPT

## 2024-11-01 ENCOUNTER — NON-APPOINTMENT (OUTPATIENT)
Age: 59
End: 2024-11-01

## 2025-01-13 ENCOUNTER — NON-APPOINTMENT (OUTPATIENT)
Age: 60
End: 2025-01-13

## 2025-01-14 ENCOUNTER — APPOINTMENT (OUTPATIENT)
Dept: ORTHOPEDIC SURGERY | Facility: CLINIC | Age: 60
End: 2025-01-14
Payer: COMMERCIAL

## 2025-01-14 VITALS
BODY MASS INDEX: 30.07 KG/M2 | SYSTOLIC BLOOD PRESSURE: 157 MMHG | DIASTOLIC BLOOD PRESSURE: 91 MMHG | HEIGHT: 69 IN | WEIGHT: 203 LBS

## 2025-01-14 DIAGNOSIS — M25.552 PAIN IN LEFT HIP: ICD-10-CM

## 2025-01-14 PROCEDURE — 73502 X-RAY EXAM HIP UNI 2-3 VIEWS: CPT

## 2025-01-14 PROCEDURE — 99214 OFFICE O/P EST MOD 30 MIN: CPT

## 2025-01-14 PROCEDURE — G2211 COMPLEX E/M VISIT ADD ON: CPT

## 2025-01-30 ENCOUNTER — APPOINTMENT (OUTPATIENT)
Dept: MRI IMAGING | Facility: CLINIC | Age: 60
End: 2025-01-30
Payer: COMMERCIAL

## 2025-01-30 ENCOUNTER — OUTPATIENT (OUTPATIENT)
Dept: OUTPATIENT SERVICES | Facility: HOSPITAL | Age: 60
LOS: 1 days | End: 2025-01-30
Payer: COMMERCIAL

## 2025-01-30 DIAGNOSIS — Z98.890 OTHER SPECIFIED POSTPROCEDURAL STATES: Chronic | ICD-10-CM

## 2025-01-30 DIAGNOSIS — M25.552 PAIN IN LEFT HIP: ICD-10-CM

## 2025-01-30 DIAGNOSIS — Z00.8 ENCOUNTER FOR OTHER GENERAL EXAMINATION: ICD-10-CM

## 2025-01-30 PROCEDURE — 73721 MRI JNT OF LWR EXTRE W/O DYE: CPT | Mod: 26,LT

## 2025-01-30 PROCEDURE — 73721 MRI JNT OF LWR EXTRE W/O DYE: CPT

## 2025-02-07 ENCOUNTER — APPOINTMENT (OUTPATIENT)
Dept: ORTHOPEDIC SURGERY | Facility: CLINIC | Age: 60
End: 2025-02-07
Payer: COMMERCIAL

## 2025-02-07 VITALS — HEIGHT: 69 IN | BODY MASS INDEX: 30.07 KG/M2 | WEIGHT: 203 LBS

## 2025-02-07 DIAGNOSIS — S73.192A OTHER SPRAIN OF LEFT HIP, INITIAL ENCOUNTER: ICD-10-CM

## 2025-02-07 PROCEDURE — G2211 COMPLEX E/M VISIT ADD ON: CPT

## 2025-02-07 PROCEDURE — 99213 OFFICE O/P EST LOW 20 MIN: CPT

## 2025-02-18 ENCOUNTER — APPOINTMENT (OUTPATIENT)
Dept: ULTRASOUND IMAGING | Facility: CLINIC | Age: 60
End: 2025-02-18
Payer: COMMERCIAL

## 2025-02-18 ENCOUNTER — OUTPATIENT (OUTPATIENT)
Dept: OUTPATIENT SERVICES | Facility: HOSPITAL | Age: 60
LOS: 1 days | End: 2025-02-18
Payer: COMMERCIAL

## 2025-02-18 DIAGNOSIS — Z98.890 OTHER SPECIFIED POSTPROCEDURAL STATES: Chronic | ICD-10-CM

## 2025-02-18 DIAGNOSIS — Z00.8 ENCOUNTER FOR OTHER GENERAL EXAMINATION: ICD-10-CM

## 2025-02-18 DIAGNOSIS — S73.192A OTHER SPRAIN OF LEFT HIP, INITIAL ENCOUNTER: ICD-10-CM

## 2025-02-18 PROCEDURE — 20611 DRAIN/INJ JOINT/BURSA W/US: CPT | Mod: LT

## 2025-02-18 PROCEDURE — 20611 DRAIN/INJ JOINT/BURSA W/US: CPT

## 2025-04-28 ENCOUNTER — APPOINTMENT (OUTPATIENT)
Dept: PHYSICAL MEDICINE AND REHAB | Facility: CLINIC | Age: 60
End: 2025-04-28
Payer: COMMERCIAL

## 2025-04-28 VITALS
BODY MASS INDEX: 30.07 KG/M2 | HEART RATE: 91 BPM | WEIGHT: 203 LBS | OXYGEN SATURATION: 97 % | DIASTOLIC BLOOD PRESSURE: 90 MMHG | SYSTOLIC BLOOD PRESSURE: 154 MMHG | RESPIRATION RATE: 15 BRPM | HEIGHT: 69 IN

## 2025-04-28 DIAGNOSIS — M25.552 PAIN IN LEFT HIP: ICD-10-CM

## 2025-04-28 DIAGNOSIS — M47.816 SPONDYLOSIS W/OUT MYELOPATHY OR RADICULOPATHY, LUMBAR REGION: ICD-10-CM

## 2025-04-28 PROCEDURE — G2211 COMPLEX E/M VISIT ADD ON: CPT

## 2025-04-28 PROCEDURE — 99214 OFFICE O/P EST MOD 30 MIN: CPT

## 2025-04-28 RX ORDER — METHOCARBAMOL 500 MG/1
500 TABLET, FILM COATED ORAL
Qty: 60 | Refills: 0 | Status: ACTIVE | COMMUNITY
Start: 2025-04-28 | End: 1900-01-01

## 2025-04-29 ENCOUNTER — RESULT REVIEW (OUTPATIENT)
Age: 60
End: 2025-04-29

## 2025-04-29 ENCOUNTER — OUTPATIENT (OUTPATIENT)
Dept: OUTPATIENT SERVICES | Facility: HOSPITAL | Age: 60
LOS: 1 days | End: 2025-04-29
Payer: COMMERCIAL

## 2025-04-29 ENCOUNTER — APPOINTMENT (OUTPATIENT)
Dept: MRI IMAGING | Facility: CLINIC | Age: 60
End: 2025-04-29
Payer: COMMERCIAL

## 2025-04-29 DIAGNOSIS — Z98.890 OTHER SPECIFIED POSTPROCEDURAL STATES: Chronic | ICD-10-CM

## 2025-04-29 DIAGNOSIS — Z00.8 ENCOUNTER FOR OTHER GENERAL EXAMINATION: ICD-10-CM

## 2025-04-29 PROCEDURE — 72148 MRI LUMBAR SPINE W/O DYE: CPT | Mod: 26

## 2025-04-29 PROCEDURE — 72148 MRI LUMBAR SPINE W/O DYE: CPT

## 2025-05-02 NOTE — ED ADULT TRIAGE NOTE - BMI (KG/M2)
Copied from CRM #[id number]. Topic: General Inquiry - Patient Advice  >> May 2, 2025  9:04 AM Chase wrote:  .Who Called: Majo with [facility]    Caller is requesting assistance/information from provider's office.    Symptoms (please be specific): N/A   How long has patient had these symptoms: N/A  List of preferred pharmacies on file (remove unneeded): [unfilled]  If different, enter pharmacy into here including location and phone number: N/A    Preferred Method of Contact: Phone Call    Patient's Preferred Phone Number on File: [phone] (cell)    Best Call Back Number, if different: [phone] (home)    Additional Information: Called stating pt will be d/c today from [facility] and will be needing a 1-2 week f/u appointment. Contact pt with appointment information. Please advise, thank you.  
30.1

## 2025-05-14 ENCOUNTER — APPOINTMENT (OUTPATIENT)
Dept: ORTHOPEDIC SURGERY | Facility: CLINIC | Age: 60
End: 2025-05-14
Payer: COMMERCIAL

## 2025-05-14 ENCOUNTER — RESULT REVIEW (OUTPATIENT)
Age: 60
End: 2025-05-14

## 2025-05-14 VITALS — BODY MASS INDEX: 30.07 KG/M2 | WEIGHT: 203 LBS | HEIGHT: 69 IN

## 2025-05-14 PROCEDURE — G2211 COMPLEX E/M VISIT ADD ON: CPT

## 2025-05-14 PROCEDURE — 99213 OFFICE O/P EST LOW 20 MIN: CPT

## 2025-05-19 ENCOUNTER — APPOINTMENT (OUTPATIENT)
Dept: PHYSICAL MEDICINE AND REHAB | Facility: CLINIC | Age: 60
End: 2025-05-19
Payer: COMMERCIAL

## 2025-05-19 VITALS
BODY MASS INDEX: 30.07 KG/M2 | WEIGHT: 203 LBS | HEIGHT: 69 IN | HEART RATE: 79 BPM | OXYGEN SATURATION: 98 % | DIASTOLIC BLOOD PRESSURE: 94 MMHG | RESPIRATION RATE: 15 BRPM | SYSTOLIC BLOOD PRESSURE: 142 MMHG

## 2025-05-19 DIAGNOSIS — S73.192A OTHER SPRAIN OF LEFT HIP, INITIAL ENCOUNTER: ICD-10-CM

## 2025-05-19 DIAGNOSIS — M25.552 PAIN IN LEFT HIP: ICD-10-CM

## 2025-05-19 DIAGNOSIS — M79.18 MYALGIA, OTHER SITE: ICD-10-CM

## 2025-05-19 DIAGNOSIS — M47.816 SPONDYLOSIS W/OUT MYELOPATHY OR RADICULOPATHY, LUMBAR REGION: ICD-10-CM

## 2025-05-19 PROCEDURE — 99214 OFFICE O/P EST MOD 30 MIN: CPT

## 2025-05-20 ENCOUNTER — APPOINTMENT (OUTPATIENT)
Dept: ULTRASOUND IMAGING | Facility: CLINIC | Age: 60
End: 2025-05-20
Payer: COMMERCIAL

## 2025-05-20 ENCOUNTER — OUTPATIENT (OUTPATIENT)
Dept: OUTPATIENT SERVICES | Facility: HOSPITAL | Age: 60
LOS: 1 days | End: 2025-05-20
Payer: COMMERCIAL

## 2025-05-20 DIAGNOSIS — S73.192A OTHER SPRAIN OF LEFT HIP, INITIAL ENCOUNTER: ICD-10-CM

## 2025-05-20 DIAGNOSIS — Z98.890 OTHER SPECIFIED POSTPROCEDURAL STATES: Chronic | ICD-10-CM

## 2025-05-20 PROCEDURE — 20611 DRAIN/INJ JOINT/BURSA W/US: CPT | Mod: LT

## 2025-05-20 PROCEDURE — 20611 DRAIN/INJ JOINT/BURSA W/US: CPT

## 2025-06-23 ENCOUNTER — NON-APPOINTMENT (OUTPATIENT)
Age: 60
End: 2025-06-23

## 2025-06-23 ENCOUNTER — RX RENEWAL (OUTPATIENT)
Age: 60
End: 2025-06-23

## 2025-06-24 ENCOUNTER — APPOINTMENT (OUTPATIENT)
Dept: FAMILY MEDICINE | Facility: CLINIC | Age: 60
End: 2025-06-24

## 2025-07-18 ENCOUNTER — APPOINTMENT (OUTPATIENT)
Dept: FAMILY MEDICINE | Facility: CLINIC | Age: 60
End: 2025-07-18
Payer: COMMERCIAL

## 2025-07-18 VITALS
SYSTOLIC BLOOD PRESSURE: 130 MMHG | WEIGHT: 205 LBS | HEIGHT: 69 IN | OXYGEN SATURATION: 98 % | BODY MASS INDEX: 30.36 KG/M2 | TEMPERATURE: 207.68 F | HEART RATE: 75 BPM | DIASTOLIC BLOOD PRESSURE: 84 MMHG

## 2025-07-18 PROBLEM — Z12.39 BREAST CANCER SCREENING: Status: ACTIVE | Noted: 2025-07-18

## 2025-07-18 PROBLEM — R92.30 DENSE BREASTS: Status: ACTIVE | Noted: 2025-07-18

## 2025-07-18 PROBLEM — Z12.4 CERVICAL CANCER SCREENING: Status: ACTIVE | Noted: 2025-07-18

## 2025-07-18 PROCEDURE — 93000 ELECTROCARDIOGRAM COMPLETE: CPT

## 2025-07-18 PROCEDURE — 99396 PREV VISIT EST AGE 40-64: CPT

## 2025-07-21 DIAGNOSIS — E78.5 HYPERLIPIDEMIA, UNSPECIFIED: ICD-10-CM

## 2025-07-21 LAB
ALBUMIN SERPL ELPH-MCNC: 4.5 G/DL
ALP BLD-CCNC: 60 U/L
ALT SERPL-CCNC: 32 U/L
ANION GAP SERPL CALC-SCNC: 12 MMOL/L
AST SERPL-CCNC: 29 U/L
BASOPHILS # BLD AUTO: 0.04 K/UL
BASOPHILS NFR BLD AUTO: 0.6 %
BILIRUB SERPL-MCNC: 1.1 MG/DL
BUN SERPL-MCNC: 12 MG/DL
CALCIUM SERPL-MCNC: 9.9 MG/DL
CHLORIDE SERPL-SCNC: 104 MMOL/L
CHOLEST SERPL-MCNC: 266 MG/DL
CO2 SERPL-SCNC: 25 MMOL/L
CREAT SERPL-MCNC: 0.77 MG/DL
EGFRCR SERPLBLD CKD-EPI 2021: 90 ML/MIN/1.73M2
EOSINOPHIL # BLD AUTO: 0.33 K/UL
EOSINOPHIL NFR BLD AUTO: 5.3 %
ESTIMATED AVERAGE GLUCOSE: 126 MG/DL
FERRITIN SERPL-MCNC: 177 NG/ML
FOLATE SERPL-MCNC: 10.5 NG/ML
GLUCOSE SERPL-MCNC: 105 MG/DL
HBA1C MFR BLD HPLC: 6 %
HCT VFR BLD CALC: 40.7 %
HDLC SERPL-MCNC: 51 MG/DL
HGB BLD-MCNC: 13 G/DL
IMM GRANULOCYTES NFR BLD AUTO: 0.3 %
IRON SATN MFR SERPL: 36 %
IRON SERPL-MCNC: 144 UG/DL
LDLC SERPL-MCNC: 198 MG/DL
LYMPHOCYTES # BLD AUTO: 1.81 K/UL
LYMPHOCYTES NFR BLD AUTO: 29.1 %
MAN DIFF?: NORMAL
MCHC RBC-ENTMCNC: 30 PG
MCHC RBC-ENTMCNC: 31.9 G/DL
MCV RBC AUTO: 93.8 FL
MONOCYTES # BLD AUTO: 0.64 K/UL
MONOCYTES NFR BLD AUTO: 10.3 %
NEUTROPHILS # BLD AUTO: 3.37 K/UL
NEUTROPHILS NFR BLD AUTO: 54.4 %
NONHDLC SERPL-MCNC: 215 MG/DL
PLATELET # BLD AUTO: 265 K/UL
POTASSIUM SERPL-SCNC: 4.5 MMOL/L
PROT SERPL-MCNC: 6.9 G/DL
RBC # BLD: 4.34 M/UL
RBC # FLD: 13.6 %
SODIUM SERPL-SCNC: 141 MMOL/L
TIBC SERPL-MCNC: 396 UG/DL
TRIGL SERPL-MCNC: 96 MG/DL
TSH SERPL-ACNC: 0.99 UIU/ML
UIBC SERPL-MCNC: 252 UG/DL
VIT B12 SERPL-MCNC: 687 PG/ML
WBC # FLD AUTO: 6.21 K/UL

## 2025-07-25 ENCOUNTER — APPOINTMENT (OUTPATIENT)
Dept: CARDIOLOGY | Facility: CLINIC | Age: 60
End: 2025-07-25
Payer: COMMERCIAL

## 2025-07-25 VITALS
HEIGHT: 69 IN | OXYGEN SATURATION: 98 % | SYSTOLIC BLOOD PRESSURE: 154 MMHG | HEART RATE: 92 BPM | BODY MASS INDEX: 30.07 KG/M2 | WEIGHT: 203 LBS | DIASTOLIC BLOOD PRESSURE: 84 MMHG

## 2025-07-25 DIAGNOSIS — K21.9 GASTRO-ESOPHAGEAL REFLUX DISEASE W/OUT ESOPHAGITIS: ICD-10-CM

## 2025-07-25 DIAGNOSIS — R73.03 PREDIABETES.: ICD-10-CM

## 2025-07-25 DIAGNOSIS — R42 DIZZINESS AND GIDDINESS: ICD-10-CM

## 2025-07-25 DIAGNOSIS — E78.5 HYPERLIPIDEMIA, UNSPECIFIED: ICD-10-CM

## 2025-07-25 DIAGNOSIS — I10 ESSENTIAL (PRIMARY) HYPERTENSION: ICD-10-CM

## 2025-07-25 DIAGNOSIS — Z87.891 PERSONAL HISTORY OF NICOTINE DEPENDENCE: ICD-10-CM

## 2025-07-25 DIAGNOSIS — R00.2 PALPITATIONS: ICD-10-CM

## 2025-07-25 PROCEDURE — 93000 ELECTROCARDIOGRAM COMPLETE: CPT

## 2025-07-25 PROCEDURE — 99204 OFFICE O/P NEW MOD 45 MIN: CPT | Mod: 25

## 2025-07-29 ENCOUNTER — NON-APPOINTMENT (OUTPATIENT)
Age: 60
End: 2025-07-29

## 2025-08-01 ENCOUNTER — APPOINTMENT (OUTPATIENT)
Age: 60
End: 2025-08-01
Payer: COMMERCIAL

## 2025-08-01 ENCOUNTER — LABORATORY RESULT (OUTPATIENT)
Age: 60
End: 2025-08-01

## 2025-08-01 VITALS — BODY MASS INDEX: 30.07 KG/M2 | HEIGHT: 69 IN | WEIGHT: 203 LBS

## 2025-08-01 DIAGNOSIS — S92.515A NONDISPLACED FRACTURE OF PROXIMAL PHALANX OF LEFT LESSER TOE(S), INITIAL ENCOUNTER FOR CLOSED FRACTURE: ICD-10-CM

## 2025-08-01 DIAGNOSIS — S90.122A CONTUSION OF LEFT LESSER TOE(S) W/OUT DAMAGE TO NAIL, INITIAL ENCOUNTER: ICD-10-CM

## 2025-08-01 DIAGNOSIS — B35.1 TINEA UNGUIUM: ICD-10-CM

## 2025-08-01 DIAGNOSIS — M79.672 PAIN IN LEFT FOOT: ICD-10-CM

## 2025-08-01 PROCEDURE — 99204 OFFICE O/P NEW MOD 45 MIN: CPT

## 2025-08-01 RX ORDER — CLOTRIMAZOLE 1% ANTIFUNGAL CREAM 1 G/100G
1 CREAM TOPICAL TWICE DAILY
Qty: 1 | Refills: 3 | Status: ACTIVE | COMMUNITY
Start: 2025-08-01 | End: 1900-01-01

## 2025-08-22 ENCOUNTER — APPOINTMENT (OUTPATIENT)
Age: 60
End: 2025-08-22

## 2025-08-29 ENCOUNTER — APPOINTMENT (OUTPATIENT)
Dept: CARDIOLOGY | Facility: CLINIC | Age: 60
End: 2025-08-29
Payer: COMMERCIAL

## 2025-08-29 PROCEDURE — 93880 EXTRACRANIAL BILAT STUDY: CPT

## 2025-08-29 PROCEDURE — 93306 TTE W/DOPPLER COMPLETE: CPT

## 2025-09-17 ENCOUNTER — APPOINTMENT (OUTPATIENT)
Dept: ULTRASOUND IMAGING | Facility: CLINIC | Age: 60
End: 2025-09-17
Payer: COMMERCIAL

## 2025-09-17 ENCOUNTER — RESULT REVIEW (OUTPATIENT)
Age: 60
End: 2025-09-17

## 2025-09-17 ENCOUNTER — APPOINTMENT (OUTPATIENT)
Dept: CT IMAGING | Facility: CLINIC | Age: 60
End: 2025-09-17
Payer: COMMERCIAL

## 2025-09-17 ENCOUNTER — APPOINTMENT (OUTPATIENT)
Dept: MAMMOGRAPHY | Facility: CLINIC | Age: 60
End: 2025-09-17
Payer: COMMERCIAL

## 2025-09-17 PROCEDURE — 77067 SCR MAMMO BI INCL CAD: CPT | Mod: 26

## 2025-09-17 PROCEDURE — 77063 BREAST TOMOSYNTHESIS BI: CPT | Mod: 26

## 2025-09-17 PROCEDURE — 75574 CT ANGIO HRT W/3D IMAGE: CPT | Mod: 26

## 2025-09-17 PROCEDURE — 76641 ULTRASOUND BREAST COMPLETE: CPT | Mod: 26,50
